# Patient Record
Sex: FEMALE | Race: BLACK OR AFRICAN AMERICAN | NOT HISPANIC OR LATINO | Employment: FULL TIME | URBAN - METROPOLITAN AREA
[De-identification: names, ages, dates, MRNs, and addresses within clinical notes are randomized per-mention and may not be internally consistent; named-entity substitution may affect disease eponyms.]

---

## 2017-10-11 ENCOUNTER — OFFICE VISIT (OUTPATIENT)
Dept: URGENT CARE | Facility: CLINIC | Age: 42
End: 2017-10-11
Payer: COMMERCIAL

## 2017-10-11 VITALS
BODY MASS INDEX: 31.55 KG/M2 | RESPIRATION RATE: 19 BRPM | HEIGHT: 67 IN | OXYGEN SATURATION: 98 % | HEART RATE: 99 BPM | WEIGHT: 201 LBS | DIASTOLIC BLOOD PRESSURE: 72 MMHG | TEMPERATURE: 99 F | SYSTOLIC BLOOD PRESSURE: 121 MMHG

## 2017-10-11 DIAGNOSIS — J06.9 UPPER RESPIRATORY TRACT INFECTION, UNSPECIFIED TYPE: Primary | ICD-10-CM

## 2017-10-11 PROCEDURE — 99203 OFFICE O/P NEW LOW 30 MIN: CPT | Mod: S$GLB,,, | Performed by: EMERGENCY MEDICINE

## 2017-10-11 RX ORDER — BENZONATATE 200 MG/1
200 CAPSULE ORAL 3 TIMES DAILY PRN
Qty: 30 CAPSULE | Refills: 0 | Status: SHIPPED | OUTPATIENT
Start: 2017-10-11 | End: 2017-10-21

## 2017-10-11 RX ORDER — TRAZODONE HYDROCHLORIDE 100 MG/1
100 TABLET ORAL NIGHTLY
COMMUNITY

## 2017-10-11 RX ORDER — SERTRALINE HYDROCHLORIDE 100 MG/1
100 TABLET, FILM COATED ORAL DAILY
COMMUNITY

## 2017-10-11 RX ORDER — ALPRAZOLAM 1 MG/1
TABLET ORAL 2 TIMES DAILY
COMMUNITY

## 2017-10-11 NOTE — PROGRESS NOTES
"Subjective:       Patient ID: Adriana Cardozo is a 42 y.o. female.    Vitals:  height is 5' 7" (1.702 m) and weight is 91.2 kg (201 lb). Her tympanic temperature is 98.7 °F (37.1 °C). Her blood pressure is 121/72 and her pulse is 99. Her respiration is 19 and oxygen saturation is 98%.     Chief Complaint: Cough    Patient with nasal congestion x 3 days. Patient also with sinus drainage and sneezing. Patient developed a cough yesterday evening. Patient stating she is a  and her work is not allowing her to come back until she has a work note. No fever. No chills. Patient without body aches.   Pt doesn't feel bad but her work requires a note      Cough   This is a new problem. The current episode started yesterday. The problem has been unchanged. The cough is non-productive. Associated symptoms include a sore throat. Pertinent negatives include no chest pain, chills, ear pain, eye redness, fever, headaches, myalgias, shortness of breath or wheezing.     Review of Systems   Constitution: Positive for malaise/fatigue. Negative for chills and fever.   HENT: Positive for congestion, hoarse voice and sore throat. Negative for ear pain.    Eyes: Negative for discharge and redness.   Cardiovascular: Negative for chest pain, dyspnea on exertion and leg swelling.   Respiratory: Positive for cough. Negative for shortness of breath, sputum production and wheezing.    Musculoskeletal: Negative for myalgias.   Gastrointestinal: Negative for abdominal pain and nausea.   Neurological: Negative for headaches.       Objective:      Physical Exam   Constitutional: She is oriented to person, place, and time. She appears well-developed and well-nourished. She is cooperative.  Non-toxic appearance. She does not appear ill. No distress.   HENT:   Head: Normocephalic and atraumatic.   Right Ear: Hearing, tympanic membrane, external ear and ear canal normal.   Left Ear: Hearing, tympanic membrane, external ear and ear canal " normal.   Nose: Mucosal edema and rhinorrhea present. No nasal deformity. No epistaxis. Right sinus exhibits no maxillary sinus tenderness and no frontal sinus tenderness. Left sinus exhibits no maxillary sinus tenderness and no frontal sinus tenderness.   Mouth/Throat: Uvula is midline and mucous membranes are normal. No trismus in the jaw. Normal dentition. No uvula swelling. Posterior oropharyngeal erythema present.   Slightly hoarse   Eyes: Conjunctivae, EOM and lids are normal. Pupils are equal, round, and reactive to light. No scleral icterus.   Sclera clear bilat   Neck: Trachea normal, normal range of motion, full passive range of motion without pain and phonation normal. Neck supple.   Cardiovascular: Normal rate, regular rhythm, normal heart sounds, intact distal pulses and normal pulses.    Pulmonary/Chest: Effort normal and breath sounds normal. No respiratory distress.   Abdominal: Soft. Normal appearance and bowel sounds are normal. She exhibits no distension. There is no tenderness.   Musculoskeletal: Normal range of motion. She exhibits no edema or deformity.   Neurological: She is alert and oriented to person, place, and time. She exhibits normal muscle tone. Coordination normal.   Skin: Skin is warm, dry and intact. She is not diaphoretic. No pallor.   Psychiatric: She has a normal mood and affect. Her speech is normal and behavior is normal. Judgment and thought content normal. Cognition and memory are normal.   Nursing note and vitals reviewed.      Assessment:       1. Upper respiratory tract infection, unspecified type        Plan:         Upper respiratory tract infection, unspecified type    Other orders  -     benzonatate (TESSALON) 200 MG capsule; Take 1 capsule (200 mg total) by mouth 3 (three) times daily as needed for Cough.  Dispense: 30 capsule; Refill: 0

## 2017-10-11 NOTE — LETTER
October 11, 2017      [facility] [place] [id number] [facility] [address]  Phone: [phone]  Fax: [phone]       Patient: Adriana Cardozo   YOB: 1975  Date of Visit: 10/11/2017    To Whom It May Concern:    Ben Cardozo  was at [facility] on 10/11/2017. She was seen here today for a URI. She should use general hygiene precautions. She has no fever and can return to work. If you have any questions or concerns, or if I can be of further assistance, please do not hesitate to contact me.    Sincerely,    Sapphire Leon MD

## 2019-02-26 ENCOUNTER — OFFICE VISIT (OUTPATIENT)
Dept: SPORTS MEDICINE | Facility: CLINIC | Age: 44
End: 2019-02-26
Payer: COMMERCIAL

## 2019-02-26 ENCOUNTER — HOSPITAL ENCOUNTER (OUTPATIENT)
Dept: RADIOLOGY | Facility: HOSPITAL | Age: 44
Discharge: HOME | End: 2019-02-26
Attending: PHYSICIAN ASSISTANT
Payer: COMMERCIAL

## 2019-02-26 VITALS
BODY MASS INDEX: 31.55 KG/M2 | SYSTOLIC BLOOD PRESSURE: 151 MMHG | HEIGHT: 67 IN | DIASTOLIC BLOOD PRESSURE: 86 MMHG | HEART RATE: 66 BPM | WEIGHT: 201 LBS

## 2019-02-26 DIAGNOSIS — G57.02 PIRIFORMIS SYNDROME OF LEFT SIDE: ICD-10-CM

## 2019-02-26 DIAGNOSIS — M25.552 LEFT HIP PAIN: Primary | ICD-10-CM

## 2019-02-26 DIAGNOSIS — E66.9 CLASS 1 OBESITY WITH BODY MASS INDEX (BMI) OF 31.0 TO 31.9 IN ADULT, UNSPECIFIED OBESITY TYPE, UNSPECIFIED WHETHER SERIOUS COMORBIDITY PRESENT: ICD-10-CM

## 2019-02-26 DIAGNOSIS — M25.552 LEFT HIP PAIN: ICD-10-CM

## 2019-02-26 PROCEDURE — 73502 X-RAY EXAM HIP UNI 2-3 VIEWS: CPT | Mod: 26,LT,, | Performed by: RADIOLOGY

## 2019-02-26 PROCEDURE — 99999 PR PBB SHADOW E&M-EST. PATIENT-LVL III: ICD-10-PCS | Mod: PBBFAC,,, | Performed by: PHYSICIAN ASSISTANT

## 2019-02-26 PROCEDURE — 99999 PR PBB SHADOW E&M-EST. PATIENT-LVL III: CPT | Mod: PBBFAC,,, | Performed by: PHYSICIAN ASSISTANT

## 2019-02-26 PROCEDURE — 73502 XR HIP 2 VIEW LEFT: ICD-10-PCS | Mod: 26,LT,, | Performed by: RADIOLOGY

## 2019-02-26 PROCEDURE — 73502 X-RAY EXAM HIP UNI 2-3 VIEWS: CPT | Mod: TC,FY,PO,LT

## 2019-02-26 PROCEDURE — 99203 PR OFFICE/OUTPT VISIT, NEW, LEVL III, 30-44 MIN: ICD-10-PCS | Mod: S$GLB,,, | Performed by: PHYSICIAN ASSISTANT

## 2019-02-26 PROCEDURE — 99203 OFFICE O/P NEW LOW 30 MIN: CPT | Mod: S$GLB,,, | Performed by: PHYSICIAN ASSISTANT

## 2019-02-26 RX ORDER — MELOXICAM 15 MG/1
TABLET ORAL
Qty: 20 TABLET | Refills: 0 | Status: SHIPPED | OUTPATIENT
Start: 2019-02-26 | End: 2019-03-13 | Stop reason: SDUPTHER

## 2019-02-27 NOTE — PROGRESS NOTES
CC: left hip pain    HPI:   Adriana Cardozo is a pleasant 43 y.o.  (occasionally lifts some patients), who reports to clinic with left hip pain. No trauma, no mech sxs/instabilty.    She reports intermittent jabbing and stabbing pain of the posterior hip and buttock area that radiates down the posterior thigh. Pain has been occurring for the last 2 weeks and has been worsening over that time. She also reports intermittent numbness and tingling of her left 1st and 2nd toes.    Patient denies back pain.     She has been taking 800mg of ibuprofen every 6 hours with little pain relief.     She walked in 2 parades with her daughter over the weekend. Pain was improved while walking but worsened after.     Today the patient rates pain at a 5/10 on visual analog scale.      Affecting ADLs and exercising    Sitting and rest between activity makes pain worse    Review of Systems   Constitution: Negative. Negative for chills, fever and night sweats.   HENT: Negative for congestion and headaches.    Eyes: Negative for blurred vision, left vision loss and right vision loss.   Cardiovascular: Negative for chest pain and syncope.   Respiratory: Negative for cough and shortness of breath.    Endocrine: Negative for polydipsia, polyphagia and polyuria.   Hematologic/Lymphatic: Negative for bleeding problem. Does not bruise/bleed easily.   Skin: Negative for dry skin, itching and rash.   Musculoskeletal: Negative for falls and muscle weakness.   Gastrointestinal: Negative for abdominal pain and bowel incontinence.   Genitourinary: Negative for bladder incontinence and nocturia.   Neurological: Negative for disturbances in coordination, loss of balance and seizures.   Psychiatric/Behavioral: Negative for depression. The patient does not have insomnia.    Allergic/Immunologic: Negative for hives and persistent infections.   All other systems negative.    PAST MEDICAL HISTORY:   Past Medical History:   Diagnosis Date     "Anxiety     PTSD (post-traumatic stress disorder)      PAST SURGICAL HISTORY: History reviewed. No pertinent surgical history.  FAMILY HISTORY:   Family History   Problem Relation Age of Onset    Cancer Mother      SOCIAL HISTORY:   Social History     Socioeconomic History    Marital status: Single     Spouse name: Not on file    Number of children: Not on file    Years of education: Not on file    Highest education level: Not on file   Social Needs    Financial resource strain: Not on file    Food insecurity - worry: Not on file    Food insecurity - inability: Not on file    Transportation needs - medical: Not on file    Transportation needs - non-medical: Not on file   Occupational History    Not on file   Tobacco Use    Smoking status: Never Smoker    Smokeless tobacco: Never Used   Substance and Sexual Activity    Alcohol use: No     Comment: quit drinking 90 days ago for [employer] purposes    Drug use: No    Sexual activity: Not on file   Other Topics Concern    Not on file   Social History Narrative    Not on file       MEDICATIONS:   Current Outpatient Medications:     alprazolam (XANAX) 1 MG tablet, Take by mouth 2 (two) times daily., Disp: , Rfl:     meloxicam (MOBIC) 15 MG tablet, Take 1 tablet by mouth once daily with food. Take 20mg prilosec once daily on days that you are taking the mobic to protect your stomach., Disp: 20 tablet, Rfl: 0    PRAZOSIN HCL (PRAZOSIN ORAL), Take by mouth., Disp: , Rfl:     sertraline (ZOLOFT) 100 MG tablet, Take 100 mg by mouth once daily., Disp: , Rfl:     trazodone (DESYREL) 100 MG tablet, Take 100 mg by mouth every evening., Disp: , Rfl:   ALLERGIES: Review of patient's allergies indicates:  No Known Allergies    VITAL SIGNS: BP (!) 151/86   Pulse 66   Ht 5' 7" (1.702 m)   Wt 91.2 kg (201 lb)   BMI 31.48 kg/m²        PHYSICAL EXAM /  HIP  PHYSICAL EXAMINATION  General:  The patient is alert and oriented x 3.  Mood is pleasant.  Observation of " ears, eyes and nose reveal no gross abnormalities.  HEENT: NCAT, sclera nonicteric  Lungs: Respirations are equal and unlabored..    left HIP EXAMINATION     OBSERVATION / INSPECTION  Gait:   Nonantalgic   Alignment:  Neutral   Scars:   None   Muscle atrophy: None   Effusion:  None   Warmth:  None   Discoloration:   None   Leg lengths:   Equal   Pelvis:   Level     TENDERNESS / CREPITUS (T/C):      T / C  Trochanteric bursa   - / -  Piriformis    + / -  SI joint    - / -  Psoas tendon   - / -  Rectus insertion  - / -  Adductor insertion  - / -  Pubic symphysis  - / -  IT band                                   - / -  Gluteus tendons                     - / -    ROM: (* = pain)    Flexion:    120 degrees*  External rotation: 50 degrees  Internal rotation with axial load: 25 degrees  Internal rotation without axial load: 35 degrees*  Abduction:  45 degrees  Adduction:   20 degrees    SPECIAL TESTS:  Pain w/ forced internal rotation (FADIR): -   Pain w/ forced external rotation (LIAT): Absent   Circumduction test:    -  Stinchfield test:    Negative   Log roll:      Negative   Snapping hip (internal):   Negative   Sit-up pain:     Negative   Resisted sit-up pain:    Negative   Resisted sit-up with adductor contraction pain:  Negative   Step-down test:    +  Trendelenburg test:    Negative  Bridge test     +     EXTREMITY NEURO-VASCULAR EXAMINATION:   Sensation:  Grossly intact to light touch all dermatomal regions.   Motor Function:  Fully intact motor function at hip, knee, foot and ankle    DTRs;  quadriceps and  achilles 2+.  No clonus and downgoing Babinski.    Vascular status:  DP and PT pulses 2+, brisk capillary refill, symmetric.    Skin:  intact, compartments soft.    OTHER FINDINGS:    + left buttock pain with straight leg raise on left at 35 degrees. No back pain though    XRAYS:  2 hip and pelvis views were independently reviewed and interpreted by myself.  No fracture, subluxation, mild DJD of left  hip.    ASSESSMENT:    1. left hip pain, acute  2. Left hip piriformis syndrome and muscle pain  hip abd/core weakness    PLAN:  1. PT for left hip abd/core, abd strengthening with HEP and piriformis work  2. mobic with prilosec. Stop other NSAIDs.  The patient was advised that NSAID-type medications have two very important potential side effects: gastrointestinal irritation including hemorrhage and renal injuries. Patient was asked to take the medication with food and to stop if they experiences any GI upset. I asked her to call for vomiting, abdominal pain or black/bloody stools. The patient expresses understanding of these issues and questions were answered. Do not take with OTC NSAIDs as discussed.       3. Ice compresses  4. Referral to Dr. Glez for left piriformis tendon/ muscle injection  5. Weight loss and the importance of this for overall joint health was discussed with the patient today.Will come up with goal next time.  6. RTC in 3-4 weeks for reheck    All questions were answered, pt will contact us for questions or concerns in the interim.

## 2019-03-04 ENCOUNTER — OFFICE VISIT (OUTPATIENT)
Dept: SPORTS MEDICINE | Facility: CLINIC | Age: 44
End: 2019-03-04
Payer: COMMERCIAL

## 2019-03-04 VITALS — BODY MASS INDEX: 31.55 KG/M2 | WEIGHT: 201 LBS | HEIGHT: 67 IN

## 2019-03-04 DIAGNOSIS — M76.892 HAMSTRING TENDINITIS OF LEFT THIGH: ICD-10-CM

## 2019-03-04 DIAGNOSIS — M79.18 PIRIFORMIS MUSCLE PAIN: ICD-10-CM

## 2019-03-04 DIAGNOSIS — M25.552 LEFT HIP PAIN: Primary | ICD-10-CM

## 2019-03-04 DIAGNOSIS — M70.72 BURSITIS OF OTHER BURSA OF LEFT HIP: ICD-10-CM

## 2019-03-04 PROCEDURE — 99214 PR OFFICE/OUTPT VISIT, EST, LEVL IV, 30-39 MIN: ICD-10-PCS | Mod: 25,S$GLB,, | Performed by: FAMILY MEDICINE

## 2019-03-04 PROCEDURE — 99999 PR PBB SHADOW E&M-EST. PATIENT-LVL II: CPT | Mod: PBBFAC,,, | Performed by: FAMILY MEDICINE

## 2019-03-04 PROCEDURE — 20551 NJX 1 TENDON ORIGIN/INSJ: CPT | Mod: LT,S$GLB,, | Performed by: FAMILY MEDICINE

## 2019-03-04 PROCEDURE — 76942 ECHO GUIDE FOR BIOPSY: CPT | Mod: 26,S$GLB,, | Performed by: FAMILY MEDICINE

## 2019-03-04 PROCEDURE — 99999 PR PBB SHADOW E&M-EST. PATIENT-LVL II: ICD-10-PCS | Mod: PBBFAC,,, | Performed by: FAMILY MEDICINE

## 2019-03-04 PROCEDURE — 20551 TENDON ORIGIN: L HIP JOINT: ICD-10-PCS | Mod: 51,LT,S$GLB, | Performed by: FAMILY MEDICINE

## 2019-03-04 PROCEDURE — 99214 OFFICE O/P EST MOD 30 MIN: CPT | Mod: 25,S$GLB,, | Performed by: FAMILY MEDICINE

## 2019-03-04 PROCEDURE — 76942 TENDON ORIGIN: L HIP JOINT: ICD-10-PCS | Mod: 26,S$GLB,, | Performed by: FAMILY MEDICINE

## 2019-03-04 RX ORDER — TRIAMCINOLONE ACETONIDE 40 MG/ML
40 INJECTION, SUSPENSION INTRA-ARTICULAR; INTRAMUSCULAR
Status: DISCONTINUED | OUTPATIENT
Start: 2019-03-04 | End: 2019-03-04 | Stop reason: HOSPADM

## 2019-03-04 RX ADMIN — TRIAMCINOLONE ACETONIDE 40 MG: 40 INJECTION, SUSPENSION INTRA-ARTICULAR; INTRAMUSCULAR at 11:03

## 2019-03-04 NOTE — PROCEDURES
"Tendon Origin: L hip joint  Date/Time: 3/4/2019 11:28 AM  Performed by: Gopi Glez MD  Authorized by: Gopi Glez MD     Consent Done?:  Yes (Verbal)  Timeout: prior to procedure the correct patient, procedure, and site was verified    Indications:  Pain  Site marked: the procedure site was marked    Timeout: prior to procedure the correct patient, procedure, and site was verified    Location:  Hip  Site:  L hip joint  Prep: patient was prepped and draped in usual sterile fashion    Ultrasonic Guidance for Needle Placement?: Yes    Needle size:  20 G  Approach:  Posterior  Medications:  40 mg triamcinolone acetonide 40 mg/mL  Patient tolerance:  Patient tolerated the procedure well with no immediate complications   Hamstring conjoined tendon origin and overlying ischial bursa  Description of ultrasound utilization for needle guidance:   Ultrasound guidance used for needle localization. Images saved and stored for documentation. The hamstring conjoined tendon was visualized at its attachment on the ischial tuberosity, as was the overlying ischial bursa. Dynamic visualization of the 20g x 3.5" needle was continuous throughout the procedure.      "

## 2019-03-04 NOTE — PROCEDURES
"Tendon Origin: L hip joint  Date/Time: 3/4/2019 11:27 AM  Performed by: Gopi Glez MD  Authorized by: Gopi Glez MD     Consent Done?:  Yes (Verbal)  Timeout: prior to procedure the correct patient, procedure, and site was verified    Indications:  Pain  Site marked: the procedure site was marked    Timeout: prior to procedure the correct patient, procedure, and site was verified    Location:  Hip  Site:  L hip joint  Prep: patient was prepped and draped in usual sterile fashion    Ultrasonic Guidance for Needle Placement?: Yes    Needle size:  20 G  Approach:  Posterolateral  Medications:  40 mg triamcinolone acetonide 40 mg/mL  Patient tolerance:  Patient tolerated the procedure well with no immediate complications   Piriformis muscle, tendon sheath, and tendon insertion injection  Description of ultrasound utilization for needle guidance:   Ultrasound guidance used for needle localization. Images saved and stored for documentation. The piriformis muscle and tendon were visualized.  Dynamic visualization of the 20g x 3.5" needle was continuous throughout the procedure.      "

## 2019-03-04 NOTE — PROGRESS NOTES
Adriana Cardozo, a 43 y.o. female, is here for evaluation of Left hip.     HISTORY OF PRESENT ILLNESS     19.03.02: Patient has had intermittent back pain for the last 3 weeks. 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      Location: posterior hip/thigh, left  Onset: acute, > 2 weeks   Palliative:    Relative rest   Oral analgesics - Mobic  Provocative:   ADLs  lifting patients    Prior: Seesamia Chaidez PA-C left hip   Progression: worsening discomfort   Quality:    Jabbing   stabbing  Radiation: numbness/tingling S1/S2  Severity: per nursing documentation  Timing: intermittent with use  Trauma: none      Review of systems (ROS):  A 10+ review of systems was performed with pertinent positives and negatives noted above in the history of present illness. Other systems were negative unless otherwise specified.      PHYSICAL EXAMINATION  General:  The patient is alert and oriented x 3.  Mood is pleasant.  Observation of ears, eyes and nose reveal no gross abnormalities.  HEENT: NCAT, sclera nonicteric  Lungs: Respirations are equal and unlabored.   Gait is coordinated. Patient can toe walk and heel walk without difficulty.    HIP/PELVIS EXAMINATION    Observation/Inspection  Gait:   Nonantalgic   Alignment:  Neutral   Scars:   None   Muscle atrophy: None   Effusion:  None   Warmth:  None   Discoloration:   None   Leg lengths:   Equal   Pelvis:    Level     Tenderness/Crepitus (T/C):      T / C  Trochanteric bursa   - / -  Piriformis    - / -  SI joint    - / -  Psoas tendon   - / -  Rectus insertion  - / -  Adductor insertion  - / -  Pubic symphysis  - / -  Ischial tuberosity  +/-    ROM: (* = pain)    Flexion:      120 degrees*  External rotation:   40 degrees  Internal rotation with axial load:  30 degrees*  Internal rotation without axial load:  40 degrees*  Abduction:    45 degrees  Adduction:     20 degrees    Special Tests:  Pain w/ forced internal rotation (FADIR):  -   Pain w/ forced  external rotation (LIAT):  -   Circumduction test:     -  Stinchfield test:     -   Log roll:       -   Snapping hip (internal):    -   Sit-up pain:      -   Resisted sit-up pain:     -   Resisted sit-up with adductor contraction pain:  -   Step-down test:     +  Trendelenburg test:     -  Bridge test      +     Extremity Neuro-vascular Examination:   Sensation:  Grossly intact to light touch all dermatomal regions.   Motor Function:  Fully intact motor function at hip, knee, foot and ankle    DTRs;  quadriceps and  achilles 2+.  No clonus and downgoing Babinski.    Vascular status:  DP and PT pulses 2+, brisk capillary refill, symmetric.    Skin:  intact, compartments soft.    Other Findings:    ASSESSMENT & PLAN  Assessment:   #1 hip / pelvis pain, left     No evidence of neurologic pathology  No evidence of vascular pathology    Imaging studies reviewed:   X-ray pelvis and hip, left 19.02    Plan:    We discussed the importance of appropriate diet, weight, and regular exercise including quadriceps strengthening     We discussed options including:  #1 watchful waiting  #2 physical therapy aimed at:   Core stability   RoM hip   Strengthening quadriceps   Gait training   #3 injection therapy:   CSI GTB    Right,     Left,    CSI iaHip    Right,     Left,    Orthobiologics   #4 MRI for further evaluation      The patient chooses #2 and #3 csi piriformis and csi conjoined hamstring tendon    Pain management: handout given  Bracing:   Physical therapy: fPT, @ [facility], begin as above   Activity (e.g. 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

## 2019-03-04 NOTE — PROCEDURES
"Large Joint Aspiration/Injection: L hip joint  Date/Time: 3/4/2019 11:27 AM  Performed by: Gopi Glez MD  Authorized by: Gopi Glez MD     Consent Done?:  Yes (Verbal)  Indications:  Pain  Procedure site marked: Yes    Timeout: Prior to procedure the correct patient, procedure, and site was verified      Location:  Hip  Site:  L hip joint  Prep: Patient was prepped and draped in usual sterile fashion    Ultrasonic Guidance for needle placement: Yes  Images are saved and documented.  Needle size:  20 G  Approach:  Posterior  Medications:  40 mg triamcinolone acetonide 40 mg/mL  Patient tolerance:  Patient tolerated the procedure well with no immediate complications    Additional Comments: Ischial bursa injection  Description of ultrasound utilization for needle guidance:   Ultrasound guidance used for needle localization.  Images saved and stored for documentation.  The ischial tuberosity and ischial bura were visualized.  Dynamic visualization of the 20g x 3.5"  needle was continuous throughout the procedure.      "

## 2019-03-13 ENCOUNTER — CLINICAL SUPPORT (OUTPATIENT)
Dept: REHABILITATION | Facility: OTHER | Age: 44
End: 2019-03-13
Payer: COMMERCIAL

## 2019-03-13 ENCOUNTER — TELEPHONE (OUTPATIENT)
Dept: SPORTS MEDICINE | Facility: CLINIC | Age: 44
End: 2019-03-13

## 2019-03-13 DIAGNOSIS — R53.1 DECREASED STRENGTH, ENDURANCE, AND MOBILITY: ICD-10-CM

## 2019-03-13 DIAGNOSIS — R68.89 DECREASED STRENGTH, ENDURANCE, AND MOBILITY: ICD-10-CM

## 2019-03-13 DIAGNOSIS — M76.892 HAMSTRING TENDINITIS OF LEFT THIGH: ICD-10-CM

## 2019-03-13 DIAGNOSIS — M25.552 LEFT HIP PAIN: ICD-10-CM

## 2019-03-13 DIAGNOSIS — G57.02 PIRIFORMIS SYNDROME OF LEFT SIDE: ICD-10-CM

## 2019-03-13 DIAGNOSIS — M70.72 BURSITIS OF OTHER BURSA OF LEFT HIP: ICD-10-CM

## 2019-03-13 DIAGNOSIS — Z74.09 DECREASED STRENGTH, ENDURANCE, AND MOBILITY: ICD-10-CM

## 2019-03-13 DIAGNOSIS — M79.18 PIRIFORMIS MUSCLE PAIN: Primary | ICD-10-CM

## 2019-03-13 PROCEDURE — 97140 MANUAL THERAPY 1/> REGIONS: CPT | Mod: PN

## 2019-03-13 PROCEDURE — 97161 PT EVAL LOW COMPLEX 20 MIN: CPT | Mod: PN

## 2019-03-13 RX ORDER — MELOXICAM 15 MG/1
TABLET ORAL
Qty: 20 TABLET | Refills: 0 | Status: SHIPPED | OUTPATIENT
Start: 2019-03-13 | End: 2019-11-18

## 2019-03-13 RX ORDER — TRAMADOL HYDROCHLORIDE 50 MG/1
50-100 TABLET ORAL EVERY 12 HOURS PRN
Qty: 16 TABLET | Refills: 0 | Status: SHIPPED | OUTPATIENT
Start: 2019-03-13 | End: 2019-11-18

## 2019-03-13 NOTE — TELEPHONE ENCOUNTER
Patient is having severe hip pain and not getting relief from injection. Will give her a refill of mobic and a few tramadol to help the pain acutely.

## 2019-03-14 NOTE — PROGRESS NOTES
Dry Needling Daily Note     Patient ID: Adriana Cardozo is a 43 y.o. female.  Diagnosis:   1. Left hip pain     2. Decreased strength, endurance, and mobility       Date:  03/13/2019    Start Time:  11:30  Stop Time:  11:55    Subjective:     Pt reports: increased L hip pain x 1 month.  Pain Scale: Adriana rates pain on a scale of 0-10 to be 7 currently.    Objective:     Pt signed written consent to dry needling Rx.  Pt gave verbal consent for DN.   Pt rec'd dry needling to L hip with 3 in needles with no adverse effects.    Homeostatic points:  1. Deep Radial  2. Greater Auricular  3. Spinal Accessory  4. Saphenous  5. Deep Fibular  6. Tibial  7. Greater Occipital  8. Suprascapular ( infraspinatus)  9. Lateral Antebrachial Cutaneous  10. Sural  11. Lateral Popliteal  12. Superficial Radial  13. Dorsal Scapular  14. Superior Cluneal  15. Posterior Cutaneous L 2  16. Inferior Gluteal  17. Lateral Pectoral  18. Ilitotibal  19. Infraorbital  20. Spinous process T7  21. Posterior cutaneous  T6  22. Posterior cutaneous L 5  23. Supraorbital  24. Common fibular    Paravertebral Points:  None today    Symptomatic Points:   TFL, glutes med, min, piriformis    Assessment:     Patient demonstrated appropriate response to FDN. Increased ST dysfunction noted to ST surrounding GT bursa. Winding technique used in 5 min increments to reduce muscle tension and relieve pain. Pt noted significant improvement in posture, walking tolerance, and pain intensity at end of session.    Patient Education/Response:     Education provided re:   Purpose, benefits, and potential side effects of dry needling.   Educated pt to use heat following treatment sessions to reduce c/o pain or soreness and to improve circulation to needled sites.   Encouraged pt to continue with HEP to maintain flexibility, ROM, and functional mobility.  Adriana verbalized good understanding of education     Plans and Goals:     Monitor response to FDN. Continue with  FDN in POC as tolerated.     Kathi John, PT  3/13/2019

## 2019-03-14 NOTE — PLAN OF CARE
"[facility]  Physical Therapy Initial Evaluation    Name: Adriana Cardozo  Clinic Number: [id number]    Therapy Diagnosis:   Encounter Diagnoses   Name Primary?    Left hip pain     Decreased strength, endurance, and mobility      Physician: Bar Chaidez III, *    Physician Orders: PT Eval and Treat - treat bilaterally with focus on left  Medical Diagnosis from Referral:   M25.552 (ICD-10-CM) - Left hip pain   G57.02 (ICD-10-CM) - Piriformis syndrome of left side       Evaluation Date: 3/13/2019  Authorization Period Expiration: 12/31/2019  Plan of Care Expiration: 6/13/2019  Visit # / Visits authorized: 1/ 20    Time In: 11:00  Time Out: 12:00  Total Billable Time: 60 minutes    Precautions: Standard    Subjective   Date of onset: 1 month  History of current condition - Adriana reports: insidious onset of L hip pain that gradually worsened over the past 4 weeks. She notes attending a bowling tournament without any prior experience with the sport. "I was trying to be like a pro, with my leg sliding behind me." She denies immediately pain after tournament, but notes waking up with worsening symptoms.  Pt reports pain with pressure on L hip, including sitting and lying supine.       Medical History:   Past Medical History:   Diagnosis Date    Anxiety     PTSD (post-traumatic stress disorder)        Surgical History:   Adriana Cardozo  has no past surgical history on file.    Medications:   Adriana has a current medication list which includes the following prescription(s): alprazolam, meloxicam, prazosin hcl, sertraline, tramadol, and trazodone.    Allergies:   Review of patient's allergies indicates:  No Known Allergies     Imaging, Xray on 02/26/2019: Findings - Mild DJD.  Faintly visualized sclerotic changes identified at the superior aspect of the left femoral head and AVN cannot be ruled out.  Further evaluation by MRI would be helpful.  No fracture or dislocation.  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)  Prior Level of Function: Independent with ADLs, HHCs, work and school activities  Current Level of Function: pain and difficulty with all functional activities    Pain:  Current 7/10, worst 10/10, best 6/10   Location: left hip and glute   Description: Burning and Deep  Aggravating Factors: direct perssure with sitting, sleeping (disrupted several times per night), getting in/out of chair, bed, stair climbing, prolonged standing and standing increases sharp pain in the calf, sitting with CPU work and driving  Easing Factors: pain medication    Pts goals: reduce pain to be able to sit through work and school    Objective     Palpation: TTP L TFL, glutes med/min, piriformis    Hip Right  Left  Pain/Dysfunction with Movement    PROM MMT PROM MMT    Flexion WFL 5 Min murray* 3+ *indicates pain  MMT limited by pain   Extension Mod murray 4 Mod murray 3    Abduction WFL 4 WFL 3    Adduction Min murray 5 Min-  Mod murray* 4-    Internal rotation Min murray 4 Min murray* 3    External rotation WFL 4 WFL 3      Knee ROM: grossly WFL  Knee strength: 5/5  Ankle ROM: grossly WFL  Ankle strength: 5/5    Flexibility:  Tony test   Hip flexors: fair-   Quads: fair-  Ronald test   ITB: fair-  Hamstring (SLR): fair-    Special tests:  Quadrant test: -  Scour test: NT  ASIS compression: -  ASIS distraction: -  SLR: -  Contralateral SLR: -  NTT: -  LIAT: +  FADIR: +    Joint Mobility: NT 2* pain  Balance: NT 2* to pain with standing       CMS Impairment/Limitation/Restriction for FOTO Hip Survey    Therapist reviewed FOTO scores for Adriana Cardozo on 3/13/2019.   FOTO documents entered into [employer] - see Media section.    Limitation Score: 45%  Category: Mobility    Current : CK = at least 40% but < 60% impaired, limited or  "restricted  Goal: CI = at least 1% but < 20% impaired, limited or restricted  Discharge: N/A         TREATMENT   Treatment Time In: 11:30  Treatment Time Out: 12:00  Total Treatment time separate from Evaluation: 30 minutes    Adriana received therapeutic exercises to develop strength, endurance, ROM, flexibility, posture and core stabilization for 5 minutes including:  Figure 4 IR stretch: 3 x 30"    Adriana received the following manual therapy techniques: 25 min x dry needling - see note by Kathi Lema, PT    Home Exercises and Patient Education Provided    Education provided:   - Patient educated regarding pathogenesis, diagnosis, protocol, prognosis, POC, and HEP. Written Home Exercises Provided with written and verbal instructions for frequency and duration of the following exercises: figure 4 piriformis stretch. Pt educated on HEP and activity modifications to reduce c/o pain and improve overall function.   - Pt was educated in posture and body mechanics.  Use of a lumbar roll was recommended and demonstrated here today.  Purchase information provided.   - Pt also educated on use of modalities prn to reduce c/o pain and dysfunction. Advised MHP following DN to improve circulation and reduce pain.  - Pt educated on clinic's cancellation/no-show policy of missing 3 consecutive PT appointments, which will result in an automatic discharge from therapy services 2* to non-compliance, unless otherwise stated.   - Patient demo good understanding of the education provided. Patient demo good return demo of skill of exercises.    Written Home Exercises Provided: yes.  Exercises were reviewed and Adriana was able to demonstrate them prior to the end of the session.  Adriana demonstrated good  understanding of the education provided.     See EMR under Patient Instructions for exercises provided 3/13/2019.    Assessment   Adriana is a 43 y.o. female referred to outpatient Physical Therapy with a medical diagnosis of " left hip pain and piriformis syndrome. Pt presents with marked limitations in muscle and soft tissue mobility as well as ROM, flexibility, and strength. Impairments limit pt with all functional activities, including work and school.    Pt prognosis is Good.   Pt will benefit from skilled outpatient Physical Therapy to address the deficits stated above and in the chart below, provide pt/family education, and to maximize pt's level of independence.     Plan of care discussed with patient: Yes  Pt's spiritual, cultural and educational needs considered and patient is agreeable to the plan of care and goals as stated below:     Anticipated Barriers for therapy: financial consideration    Medical Necessity is demonstrated by the following  History  Co-morbidities and personal factors that may impact the plan of care Co-morbidities:   coping style/mechanism, depression, difficulty sleeping, education level, excessive commute time/distance, financial considerations and level of undertstanding of current condition    Personal Factors:   age  coping style  social background  lifestyle     moderate   Examination  Body Structures and Functions, activity limitations and participation restrictions that may impact the plan of care Body Regions:   back  lower extremities  trunk    Body Systems:    gross symmetry  ROM  strength  gross coordinated movement  balance  gait  transfers  transitions  motor control  motor learning  posture, MF mobility, flexibility    Participation Restrictions:   ADLs, HHCs, driving, sleeping, work, school    Activity limitations:   Learning and applying knowledge  no deficits    General Tasks and Commands  no deficits    Communication  no deficits    Mobility  walking  driving (bike, car, motorcycle)    Self care  looking after one's health    Domestic Life  shopping  cooking  doing house work (cleaning house, washing dishes, laundry)  assisting others    Interactions/Relationships  no deficits    Life  Areas  school education  employment    Community and Social Life  community life  recreation and leisure  Christianity and spirituality         moderate   Clinical Presentation stable and uncomplicated low   Decision Making/ Complexity Score: low     Goals:  Short Term Goals (4 Weeks):  1. 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  2.  Pt able to transfer in/out of chairs of various heights with <5/10 pain.  3. Pt able to sleep >4 hours without pain disturbance.  4. Pt to demonstrate improved functional ability with FOTO limitation <=35% disability.    Long Term Goals (8 Weeks):  1. Pt able to sit >= 1 hour with CPU work with <3/10 pain.  2. Pt is independent with all bed mobility.  3. Pt able to sleep a full night without pain disturbance.  4. Pt able to return to full work / recreational activities with min difficulty and pain <3/10.  5. Pt will be independent with HEP and self management of symptoms.   6. Pt to demonstrate improved functional ability with FOTO limitation <=25% disability.      Plan     Plan of care Certification: 3/13/2019 to 06/13/2019.     Outpatient Physical Therapy 2 times weekly for 8 weeks to include the following interventions: Aquatic Therapy, Cervical/Lumbar Traction, Electrical Stimulation prn, Iontophoresis (with dexamethasone prn), Manual Therapy, Moist Heat/ Ice, Neuromuscular Re-ed, Patient Education, Self Care, Therapeutic Activites, Therapeutic Exercise and IASTYM, therapeutic taping, dry needling. Progress HEP towards D/C. Recommend F/U with MD if symptoms worsen or do not resolve. Patient may be seen by a PTA for treatment to carry out their plan of care.  Face-to-face conferences will be held.           Kathi Jonh, PT

## 2019-03-15 ENCOUNTER — CLINICAL SUPPORT (OUTPATIENT)
Dept: REHABILITATION | Facility: OTHER | Age: 44
End: 2019-03-15
Payer: COMMERCIAL

## 2019-03-15 DIAGNOSIS — Z74.09 DECREASED STRENGTH, ENDURANCE, AND MOBILITY: ICD-10-CM

## 2019-03-15 DIAGNOSIS — R53.1 DECREASED STRENGTH, ENDURANCE, AND MOBILITY: ICD-10-CM

## 2019-03-15 DIAGNOSIS — R68.89 DECREASED STRENGTH, ENDURANCE, AND MOBILITY: ICD-10-CM

## 2019-03-15 DIAGNOSIS — M25.552 LEFT HIP PAIN: ICD-10-CM

## 2019-03-15 PROCEDURE — 97110 THERAPEUTIC EXERCISES: CPT | Mod: PN

## 2019-03-15 NOTE — PROGRESS NOTES
"  Physical Therapy Daily Treatment Note     Name: Adriana HERNANDEZ [facility]  Clinic Number: [id number]    Therapy Diagnosis:   Encounter Diagnoses   Name Primary?    Left hip pain     Decreased strength, endurance, and mobility      Physician: Bar Chaidez III, *    Visit Date: 3/15/2019    Physician Orders: PT Eval and Treat - treat bilaterally with focus on left   Medical Diagnosis:   M25.552 (ICD-10-CM) - Left hip pain   G57.02 (ICD-10-CM) - Piriformis syndrome of left side     Evaluation Date: 3/13/2019  Authorization Period Expiration: 12/31/2019  Plan of Care Certification Period: 6/13/2019  Visit #/Visits authorized: 2/20     Time In: 11:00 AM  Time Out: 12:00 PM  Total Billable Time: 45 minutes    Precautions: Standard    Subjective     Pt reports: "My calf feels so tight that I want to hit it with a hammer." Pt states that the pain is random and can originate anywhere from her buttock, lateral knee region, and gastroc. Pt also stated that she is in most pain when she is lying flat or standing in one spot for long periods of time.  She was compliant with home exercise program.  Response to previous treatment: fine, relief from dry needling  Functional change: no new changes    Pain: 8/10  Location: left buttocks, L lateral knee, gastroc    Objective     Adriana received therapeutic exercises to develop strength, endurance, ROM, flexibility, posture and core stabilization for 45 minutes including:  Glute sets 20 x 5"  Bridges 2 x 10  Hip adduction ball squeeze 20 x 5"  SLR 2 x 10 (initially required assistance, but able to perform after few reps)  SL clamshells 20 x 5"  SL hip abd 2 x 10  Figure 4 IR/ER stretch 3 x 30"  Piriformis stretch 3 x 30"  Hamstring stretch 3 x 30 "(manually by PTA)  Gastroc SB stretch 3 x 30"    Adriana received the following manual therapy techniques: Myofacial release, Soft tissue Mobilization and Friction Massage were applied to the: L hip for 5 minutes, including:  Rolling stick to " ITB, gluteals, gastroc region    Adriana received hot pack for 10 minutes to glutes, gastroc      Home Exercises Provided and Patient Education Provided     Education provided:   - rationale behind each ex, proper form and technique    Written Home Exercises Provided: Patient instructed to cont prior HEP.  Exercises were reviewed and Adriana was able to demonstrate them prior to the end of the session.  Adriana demonstrated good  understanding of the education provided.     See EMR under Media for exercises provided prior visit.    Assessment     Fair tolerance with initial reports of increased pain during therex and pain subsided as patient continued reps. Pt required VC / TC to keep pelvis from rolling during SL clam / abd. Pt self-reports of decreased pain following intervention today.  Adriana is progressing well towards her goals.   Pt prognosis is Guarded.     Pt will continue to benefit from skilled outpatient physical therapy to address the deficits listed in the problem list box on initial evaluation, provide pt/family education and to maximize pt's level of independence in the home and community environment.     Pt's spiritual, cultural and educational needs considered and pt agreeable to plan of care and goals.     Anticipated barriers to physical therapy: financial consideration     Goals:     Short Term Goals (4 Weeks):  1. 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  2.  Pt able to transfer in/out of chairs of various heights with <5/10 pain.  3. Pt able to sleep >4 hours without pain disturbance.  4. Pt to demonstrate improved functional ability with FOTO limitation <=35% disability.     Long Term Goals (8 Weeks):  1. Pt able to sit >= 1 hour with CPU work with <3/10 pain.  2. Pt is independent with all bed mobility.  3. Pt able to sleep a full night without pain disturbance.  4. Pt able to return to full work / recreational activities with min difficulty and pain <3/10.  5. Pt will be  independent with HEP and self management of symptoms.   6. Pt to demonstrate improved functional ability with FOTO limitation <=25% disability.     Plan     Continue with established PT Plan of Care and focus on hip ROM, strengthening, and manual therapy.    Salinas Gonsales, PTA

## 2019-03-20 ENCOUNTER — CLINICAL SUPPORT (OUTPATIENT)
Dept: REHABILITATION | Facility: OTHER | Age: 44
End: 2019-03-20
Payer: COMMERCIAL

## 2019-03-20 DIAGNOSIS — M25.552 LEFT HIP PAIN: ICD-10-CM

## 2019-03-20 DIAGNOSIS — Z74.09 DECREASED STRENGTH, ENDURANCE, AND MOBILITY: ICD-10-CM

## 2019-03-20 DIAGNOSIS — R68.89 DECREASED STRENGTH, ENDURANCE, AND MOBILITY: ICD-10-CM

## 2019-03-20 DIAGNOSIS — R53.1 DECREASED STRENGTH, ENDURANCE, AND MOBILITY: ICD-10-CM

## 2019-03-20 PROCEDURE — 97110 THERAPEUTIC EXERCISES: CPT | Mod: PN

## 2019-03-20 NOTE — PROGRESS NOTES
"  Physical Therapy Daily Treatment Note     Name: Adriana HERNANDEZ [facility] Number: [id number]    Therapy Diagnosis:   Encounter Diagnoses   Name Primary?    Left hip pain     Decreased strength, endurance, and mobility      Physician: Bar Chaidez III, *    Visit Date: 3/20/2019    Physician Orders: PT Eval and Treat - treat bilaterally with focus on left   Medical Diagnosis:   M25.552 (ICD-10-CM) - Left hip pain   G57.02 (ICD-10-CM) - Piriformis syndrome of left side     Evaluation Date: 3/13/2019  Authorization Period Expiration: 12/31/2019  Plan of Care Certification Period: 6/13/2019  Visit #/Visits authorized: 3/20    Time In: 4:00 PM  Time Out: 5:00 PM  Total Billable Time: 30 minutes    Precautions: Standard    Subjective     Pt reports: "I have been trying to walk without a limp because I have been experiencing low back pain."   She was compliant with home exercise program.  Response to previous treatment: fine, relief from dry needling  Functional change: no new changes    Pain: 5/10  Location: left buttocks, L lateral knee, gastroc    Objective     Adriana received therapeutic exercises to develop strength, endurance, ROM, flexibility, posture and core stabilization for 45 minutes including:  Glute sets 20 x 5"  Bridges 2 x 10  Hip adduction ball squeeze 20 x 5"  SLR 2 x 10 (initially required assistance, but able to perform after few reps)  SL clamshells 20 x 5"  SL hip abd 2 x 10  Figure 4 IR/ER stretch 3 x 30"  Piriformis stretch 3 x 30"  Hamstring stretch 3 x 30 "(manually by PTA)  Gastroc SB stretch 3 x 30"    Adriana received the following manual therapy techniques: Myofacial release, Soft tissue Mobilization and Friction Massage were applied to the: L hip for 5 minutes, including:  Rolling stick to ITB, gluteals, gastroc region    Adriana received hot pack for 10 minutes to glutes, gastroc      Home Exercises Provided and Patient Education Provided     Education provided:   - rationale behind " each ex, proper form and technique    Written Home Exercises Provided: Patient instructed to cont prior HEP.  Exercises were reviewed and Adriana was able to demonstrate them prior to the end of the session.  Adriana demonstrated good  understanding of the education provided.     See EMR under Media for exercises provided prior visit.    Assessment     Pt tolerated treatment session today with frequent reports of increased pain throughout ex. Responded well to rolling pin and reports of relief.   Adriana is progressing well towards her goals.   Pt prognosis is Guarded.     Pt will continue to benefit from skilled outpatient physical therapy to address the deficits listed in the problem list box on initial evaluation, provide pt/family education and to maximize pt's level of independence in the home and community environment.     Pt's spiritual, cultural and educational needs considered and pt agreeable to plan of care and goals.     Anticipated barriers to physical therapy: financial consideration     Goals:     Short Term Goals (4 Weeks):  1. 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 (progressing, not met)  2.  Pt able to transfer in/out of chairs of various heights with <5/10 pain . (progressing, not met)   3. Pt able to sleep >4 hours without pain disturbance. (progressing, not met)   4. Pt to demonstrate improved functional ability with FOTO limitation <=35% disability. (progressing, not met)     Long Term Goals (8 Weeks):  1. Pt able to sit >= 1 hour with CPU work with <3/10 pain. (progressing, not met)   2. Pt is independent with all bed mobility. (progressing, not met)   3. Pt able to sleep a full night without pain disturbance. (progressing, not met)   4. Pt able to return to full work / recreational activities with min difficulty and pain <3/10. (progressing, not met)   5. Pt will be independent with HEP and self management of symptoms.  (progressing, not met)   6. Pt to demonstrate  improved functional ability with FOTO limitation <=25% disability.  (progressing, not met)     Plan     Continue with established PT Plan of Care and focus on hip ROM, strengthening, and manual therapy.    Salinas Gonsales, PTA

## 2019-03-22 ENCOUNTER — CLINICAL SUPPORT (OUTPATIENT)
Dept: REHABILITATION | Facility: OTHER | Age: 44
End: 2019-03-22
Payer: COMMERCIAL

## 2019-03-22 DIAGNOSIS — M25.552 LEFT HIP PAIN: ICD-10-CM

## 2019-03-22 DIAGNOSIS — Z74.09 DECREASED STRENGTH, ENDURANCE, AND MOBILITY: ICD-10-CM

## 2019-03-22 DIAGNOSIS — R68.89 DECREASED STRENGTH, ENDURANCE, AND MOBILITY: ICD-10-CM

## 2019-03-22 DIAGNOSIS — R53.1 DECREASED STRENGTH, ENDURANCE, AND MOBILITY: ICD-10-CM

## 2019-03-22 PROCEDURE — 97140 MANUAL THERAPY 1/> REGIONS: CPT | Mod: PN

## 2019-03-22 PROCEDURE — 97110 THERAPEUTIC EXERCISES: CPT | Mod: PN

## 2019-03-22 NOTE — PROGRESS NOTES
"  Physical Therapy Daily Treatment Note     Name: Adriana HERNANDEZ [facility] Number: [id number]    Therapy Diagnosis:   Encounter Diagnoses   Name Primary?    Left hip pain     Decreased strength, endurance, and mobility      Physician: Bar Chaidez III, *    Visit Date: 3/22/2019    Physician Orders: PT Eval and Treat - treat bilaterally with focus on left   Medical Diagnosis:   M25.552 (ICD-10-CM) - Left hip pain   G57.02 (ICD-10-CM) - Piriformis syndrome of left side     Evaluation Date: 3/13/2019  Authorization Period Expiration: 12/31/2019  Plan of Care Certification Period: 6/13/2019  Visit #/Visits authorized: 3/20    Time In: 10:00 AM  Time Out: 11:00 AM  Total Billable Time: 60 minutes    Precautions: Standard    Subjective     Pt reports: improved glute pain but continues to have pain in the upper hamstring and calf.   She was compliant with home exercise program.  Response to previous treatment: fine, relief from dry needling  Functional change: improved tolerance with walking    Pain: 3/10  Location: left buttocks, L lateral knee, gastroc    Objective     Adriana received therapeutic exercises to develop strength, endurance, ROM, flexibility, posture and core stabilization for 45 minutes including:    Glute sets 20 x 5"  Bridges 2 x 10  Hip adduction ball squeeze 20 x 5"  SLR 2 x 10 (initially required assistance, but able to perform after few reps)  SL clamshells 20 x 5"  SL hip abd 2 x 10  Figure 4 IR/ER stretch 3 x 30"  Piriformis stretch 3 x 30"  Hamstring stretch 3 x 30 "(manually by PTA) - changed to using strap today  Gastroc SB stretch 3 x 30" -- change to incline board today    Adriana received the following manual therapy techniques:   15 min x dry needling - see note by Kathi Lema, PT   00 min x Myofacial release, Soft tissue Mobilization and Friction Massage were applied to the: L hip, including: Rolling stick to ITB, gluteals, gastroc region    Adriana received hot pack for 10 " minutes to glutes, gastroc --- performed simultaneous with therex today      Home Exercises Provided and Patient Education Provided     Education provided:   - rationale behind each ex, proper form and technique    Written Home Exercises Provided: Patient instructed to cont prior HEP.  Exercises were reviewed and Adriana was able to demonstrate them prior to the end of the session.  Adriana demonstrated good  understanding of the education provided.     See EMR under Media for exercises provided prior visit.    Assessment     Pt tolerated overall session well today, with self reported reduction of glute pain at end of session. Progress glute strength next visit, with addition of sciatic nerve glides.  Adriana is progressing well towards her goals.   Pt prognosis is Guarded.     Pt will continue to benefit from skilled outpatient physical therapy to address the deficits listed in the problem list box on initial evaluation, provide pt/family education and to maximize pt's level of independence in the home and community environment.     Pt's spiritual, cultural and educational needs considered and pt agreeable to plan of care and goals.     Anticipated barriers to physical therapy: financial consideration     Goals:     Short Term Goals (4 Weeks):  1. 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 (progressing, not met)  2.  Pt able to transfer in/out of chairs of various heights with <5/10 pain . (progressing, not met)   3. Pt able to sleep >4 hours without pain disturbance. (progressing, not met)   4. Pt to demonstrate improved functional ability with FOTO limitation <=35% disability. (progressing, not met)     Long Term Goals (8 Weeks):  1. Pt able to sit >= 1 hour with CPU work with <3/10 pain. (progressing, not met)   2. Pt is independent with all bed mobility. (progressing, not met)   3. Pt able to sleep a full night without pain disturbance. (progressing, not met)   4. Pt able to return to full work  / recreational activities with min difficulty and pain <3/10. (progressing, not met)   5. Pt will be independent with HEP and self management of symptoms.  (progressing, not met)   6. Pt to demonstrate improved functional ability with FOTO limitation <=25% disability.  (progressing, not met)     Plan     Continue with established PT Plan of Care and focus on hip ROM, strengthening, and manual therapy.    Kathi John, PT

## 2019-03-22 NOTE — PROGRESS NOTES
Dry Needling Daily Note     Patient ID: Adriana Cardozo is a 43 y.o. female.  Diagnosis:   1. Left hip pain     2. Decreased strength, endurance, and mobility       Date:  03/22/2019    Start Time:  10:10  Stop Time:  10:25    Subjective:     Pt reports: increased L hip pain x 1 month.  Pain Scale: Adriana rates pain on a scale of 0-10 to be 7 currently.    Objective:     Pt signed written consent to dry needling Rx.  Pt gave verbal consent for DN.   Pt rec'd dry needling to L hip with 3 in needles with no adverse effects.    Homeostatic points:  1. Deep Radial  2. Greater Auricular  3. Spinal Accessory  4. Saphenous  5. Deep Fibular  6. Tibial  7. Greater Occipital  8. Suprascapular ( infraspinatus)  9. Lateral Antebrachial Cutaneous  10. Sural  11. Lateral Popliteal  12. Superficial Radial  13. Dorsal Scapular  14. Superior Cluneal  15. Posterior Cutaneous L 2  16. Inferior Gluteal  17. Lateral Pectoral  18. Ilitotibal  19. Infraorbital  20. Spinous process T7  21. Posterior cutaneous  T6  22. Posterior cutaneous L 5  23. Supraorbital  24. Common fibular    Paravertebral Points:  L4    Symptomatic Points:   TFL, glutes med, min, piriformis, lateral hamstring muscle belly     Assessment:     Patient demonstrated appropriate response to FDN. Increased ST dysfunction noted to lower LSP, lateral hamstring muscle belly, and gastroc. Winding technique used in 5 min increments to reduce muscle tension and relieve pain. Pt noted significant improvement in posture, walking tolerance, and pain intensity at end of session.    Patient Education/Response:     Education provided re:   Purpose, benefits, and potential side effects of dry needling.   Educated pt to use heat following treatment sessions to reduce c/o pain or soreness and to improve circulation to needled sites.   Encouraged pt to continue with HEP to maintain flexibility, ROM, and functional mobility.  Adriana verbalized good understanding of education      Plans and Goals:     Monitor response to FDN. Continue with FDN in POC as tolerated.     Kathi John, PT  3/22/2019

## 2019-03-26 NOTE — PROGRESS NOTES
"  Physical Therapy Daily Treatment Note     Name: Adriana HERNANDEZ [facility] Number: [id number]    Therapy Diagnosis:   Encounter Diagnoses   Name Primary?    Left hip pain     Decreased strength, endurance, and mobility      Physician: Bar Chaidez III, *    Visit Date: 3/27/2019    Physician Orders: PT Eval and Treat - treat bilaterally with focus on left   Medical Diagnosis:   M25.552 (ICD-10-CM) - Left hip pain   G57.02 (ICD-10-CM) - Piriformis syndrome of left side     Evaluation Date: 3/13/2019  Authorization Period Expiration: 12/31/2019  Plan of Care Certification Period: 6/13/2019  Visit #/Visits authorized: 4/20    Time In: 11:05 AM  Time Out: 12:00 PM  Total Billable Time: 55 minutes    Precautions: Standard    Subjective     Pt reports: she no longer has lateral glute pain. Her c/c today is more "tension" in the back L thigh and superior calf. She attempts to stretch the calf and hamstrings, which helps somewhat.  She was compliant with home exercise program.  Response to previous treatment: fine, relief from dry needling  Functional change: improved tolerance with walking    Pain: 3/10  Location: left buttocks, L lateral knee, gastroc    Objective     Adriana received therapeutic exercises to develop strength, endurance, ROM, flexibility, posture and core stabilization for 30 minutes including:    Glute sets 20 x 5"  Bridges 2 x 10  Hip adduction ball squeeze 20 x 5"  SLR 2 x 10  SL clamshells 20 x 5"  SL hip abd 2 x 10  Figure 4 IR/ER stretch 3 x 30"  Piriformis stretch 3 x 30"  Hamstring stretch 2 x 1' (manually by PTA) - changed to using strap today  Gastroc SB stretch 2 x 1' -- change to incline board today    Adriana received the following manual therapy techniques:   25 min x dry needling - see note by Kathi Lema, PT   00 min x Myofacial release, Soft tissue Mobilization and Friction Massage were applied to the: L hip, including: Rolling stick to ITB, gluteals, gastroc region    Adriana " received hot pack for 10 minutes to glutes, gastroc --- performed simultaneous with therex today      Home Exercises Provided and Patient Education Provided     Education provided:   - rationale behind each ex, proper form and technique    Written Home Exercises Provided: Patient instructed to cont prior HEP.  Exercises were reviewed and Adriana was able to demonstrate them prior to the end of the session.  Adriana demonstrated good  understanding of the education provided.     See EMR under Media for exercises provided prior visit.    Assessment     Decreased MF mobility of lateral HS and gastroc with TTP into lateral calf. Able to resume several LE strengthening exercises today with improved tolerance.  Adriana is progressing well towards her goals.   Pt prognosis is Guarded.     Pt will continue to benefit from skilled outpatient physical therapy to address the deficits listed in the problem list box on initial evaluation, provide pt/family education and to maximize pt's level of independence in the home and community environment.     Pt's spiritual, cultural and educational needs considered and pt agreeable to plan of care and goals.     Anticipated barriers to physical therapy: financial consideration     Goals:     Short Term Goals (4 Weeks):  1. 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 (progressing, not met)  2.  Pt able to transfer in/out of chairs of various heights with <5/10 pain . (progressing, not met)   3. Pt able to sleep >4 hours without pain disturbance. (progressing, not met)   4. Pt to demonstrate improved functional ability with FOTO limitation <=35% disability. (progressing, not met)     Long Term Goals (8 Weeks):  1. Pt able to sit >= 1 hour with CPU work with <3/10 pain. (progressing, not met)   2. Pt is independent with all bed mobility. (progressing, not met)   3. Pt able to sleep a full night without pain disturbance. (progressing, not met)   4. Pt able to return to full  work / recreational activities with min difficulty and pain <3/10. (progressing, not met)   5. Pt will be independent with HEP and self management of symptoms.  (progressing, not met)   6. Pt to demonstrate improved functional ability with FOTO limitation <=25% disability.  (progressing, not met)     Plan     Continue with established PT Plan of Care and focus on hip ROM, strengthening, and manual therapy.    Kathi John, PT

## 2019-03-27 ENCOUNTER — CLINICAL SUPPORT (OUTPATIENT)
Dept: REHABILITATION | Facility: OTHER | Age: 44
End: 2019-03-27
Payer: COMMERCIAL

## 2019-03-27 DIAGNOSIS — R68.89 DECREASED STRENGTH, ENDURANCE, AND MOBILITY: ICD-10-CM

## 2019-03-27 DIAGNOSIS — Z74.09 DECREASED STRENGTH, ENDURANCE, AND MOBILITY: ICD-10-CM

## 2019-03-27 DIAGNOSIS — R53.1 DECREASED STRENGTH, ENDURANCE, AND MOBILITY: ICD-10-CM

## 2019-03-27 DIAGNOSIS — M25.552 LEFT HIP PAIN: ICD-10-CM

## 2019-03-27 PROCEDURE — 97140 MANUAL THERAPY 1/> REGIONS: CPT | Mod: PN

## 2019-03-27 PROCEDURE — 97110 THERAPEUTIC EXERCISES: CPT | Mod: PN

## 2019-03-27 NOTE — PROGRESS NOTES
Dry Needling Daily Note     Patient ID: Adriana Cardozo is a 43 y.o. female.  Diagnosis:   1. Left hip pain     2. Decreased strength, endurance, and mobility       Date:  03/27/2019    Start Time:  10:10  Stop Time:  10:35    Subjective:     Pt reports: continued L posterior hamstring and calf pain today.  Pain Scale: Adriana rates pain on a scale of 0-10 to be 3 currently.    Objective:     Pt signed written consent to dry needling Rx.  Pt gave verbal consent for DN.   Pt rec'd dry needling to L hip with 3 in needles with no adverse effects.    Homeostatic points:  1. Deep Radial  2. Greater Auricular  3. Spinal Accessory  4. Saphenous  5. Deep Fibular  6. Tibial  7. Greater Occipital  8. Suprascapular ( infraspinatus)  9. Lateral Antebrachial Cutaneous  10. Sural  11. Lateral Popliteal  12. Superficial Radial  13. Dorsal Scapular  14. Superior Cluneal  15. Posterior Cutaneous L 2  16. Inferior Gluteal  17. Lateral Pectoral  18. Ilitotibal  19. Infraorbital  20. Spinous process T7  21. Posterior cutaneous  T6  22. Posterior cutaneous L 5  23. Supraorbital  24. Common fibular    Paravertebral Points:  L4    Symptomatic Points:   TFL, glutes med, min, piriformis, lateral hamstring muscle belly, lateral gastroc head    Assessment:     Patient demonstrated appropriate response to FDN. Increased ST dysfunction noted to lateral gastroc and ITB. Increased ease with SLR following DN today.    Patient Education/Response:     Education provided re:   Purpose, benefits, and potential side effects of dry needling.   Educated pt to use heat following treatment sessions to reduce c/o pain or soreness and to improve circulation to needled sites.   Encouraged pt to continue with HEP to maintain flexibility, ROM, and functional mobility.  Adriana verbalized good understanding of education     Plans and Goals:     Monitor response to FDN. Continue with FDN in POC as tolerated.     Kathi John, PT  3/27/2019

## 2019-03-29 ENCOUNTER — CLINICAL SUPPORT (OUTPATIENT)
Dept: REHABILITATION | Facility: OTHER | Age: 44
End: 2019-03-29
Payer: COMMERCIAL

## 2019-03-29 DIAGNOSIS — R53.1 DECREASED STRENGTH, ENDURANCE, AND MOBILITY: ICD-10-CM

## 2019-03-29 DIAGNOSIS — Z74.09 DECREASED STRENGTH, ENDURANCE, AND MOBILITY: ICD-10-CM

## 2019-03-29 DIAGNOSIS — R68.89 DECREASED STRENGTH, ENDURANCE, AND MOBILITY: ICD-10-CM

## 2019-03-29 DIAGNOSIS — M25.552 LEFT HIP PAIN: ICD-10-CM

## 2019-03-29 PROCEDURE — 97110 THERAPEUTIC EXERCISES: CPT | Mod: PN

## 2019-03-29 NOTE — PROGRESS NOTES
"  Physical Therapy Daily Treatment Note     Name: Adriana HERNANDEZ [facility] Number: [id number]    Therapy Diagnosis:   Encounter Diagnoses   Name Primary?    Left hip pain     Decreased strength, endurance, and mobility      Physician: Bar Chaidez III, *    Visit Date: 3/29/2019    Physician Orders: PT Eval and Treat - treat bilaterally with focus on left   Medical Diagnosis:   M25.552 (ICD-10-CM) - Left hip pain   G57.02 (ICD-10-CM) - Piriformis syndrome of left side     Evaluation Date: 3/13/2019  Authorization Period Expiration: 12/31/2019  Plan of Care Certification Period: 6/13/2019  Visit #/Visits authorized: 5/20    Time In: 8:00 AM  Time Out: 8:45 PM  Total Billable Time: 35 minutes    Precautions: Standard    Subjective     Pt reports: stiffness in the AM "that's why i'm walking funny." She says that she has to leave early to go to work.  She was compliant with home exercise program.  Response to previous treatment: fine, relief from dry needling  Functional change: improved tolerance with walking    Pain: 3/10  Location: left buttocks, L lateral knee, gastroc    Objective     Adriana received therapeutic exercises to develop strength, endurance, ROM, flexibility, posture and core stabilization for 35 minutes including:    Glute sets 20 x 5"  Bridges 2 x 10  Hip adduction ball squeeze 20 x 5"  SLR 2 x 10  SL clamshells 20 x 5"  SL hip abd 2 x 10  Figure 4 IR/ER stretch 3 x 30"  Piriformis stretch 3 x 30"  Hamstring stretch 2 x 1' (manually by PTA) - changed to using strap today  Gastroc SB stretch 2 x 1' -- change to incline board today    Adriana received the following manual therapy techniques:   00 min x dry needling - see note by Kathi Lema, PT   00 min x Myofacial release, Soft tissue Mobilization and Friction Massage were applied to the: L hip, including: Rolling stick to ITB, gluteals, gastroc region    Adriana received hot pack for 10 minutes to glutes, gastroc --- performed at end of " therapy session today    Home Exercises Provided and Patient Education Provided     Education provided:   - rationale behind each ex, proper form and technique    Written Home Exercises Provided: Patient instructed to cont prior HEP.  Exercises were reviewed and Adriana was able to demonstrate them prior to the end of the session.  Adriana demonstrated good  understanding of the education provided.     See EMR under Media for exercises provided prior visit.    Assessment     Limited progression of therex due to time constraints. Pt tolerated session well with improved ambulation by end of session. Progress trunk and hip strengthening next visit.    Adriana is progressing well towards her goals.   Pt prognosis is Guarded.     Pt will continue to benefit from skilled outpatient physical therapy to address the deficits listed in the problem list box on initial evaluation, provide pt/family education and to maximize pt's level of independence in the home and community environment.     Pt's spiritual, cultural and educational needs considered and pt agreeable to plan of care and goals.     Anticipated barriers to physical therapy: financial consideration     Goals:     Short Term Goals (4 Weeks):  1. 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 (progressing, not met)  2.  Pt able to transfer in/out of chairs of various heights with <5/10 pain . (progressing, not met)   3. Pt able to sleep >4 hours without pain disturbance. (progressing, not met)   4. Pt to demonstrate improved functional ability with FOTO limitation <=35% disability. (progressing, not met)     Long Term Goals (8 Weeks):  1. Pt able to sit >= 1 hour with CPU work with <3/10 pain. (progressing, not met)   2. Pt is independent with all bed mobility. (progressing, not met)   3. Pt able to sleep a full night without pain disturbance. (progressing, not met)   4. Pt able to return to full work / recreational activities with min difficulty and pain  <3/10. (progressing, not met)   5. Pt will be independent with HEP and self management of symptoms.  (progressing, not met)   6. Pt to demonstrate improved functional ability with FOTO limitation <=25% disability.  (progressing, not met)     Plan     Continue with established PT Plan of Care and focus on hip ROM, strengthening, and manual therapy.    Kathi John, PT

## 2019-04-05 ENCOUNTER — CLINICAL SUPPORT (OUTPATIENT)
Dept: REHABILITATION | Facility: OTHER | Age: 44
End: 2019-04-05
Payer: COMMERCIAL

## 2019-04-05 DIAGNOSIS — M25.552 LEFT HIP PAIN: ICD-10-CM

## 2019-04-05 DIAGNOSIS — Z74.09 DECREASED STRENGTH, ENDURANCE, AND MOBILITY: ICD-10-CM

## 2019-04-05 DIAGNOSIS — R53.1 DECREASED STRENGTH, ENDURANCE, AND MOBILITY: ICD-10-CM

## 2019-04-05 DIAGNOSIS — R68.89 DECREASED STRENGTH, ENDURANCE, AND MOBILITY: ICD-10-CM

## 2019-04-05 PROCEDURE — 97110 THERAPEUTIC EXERCISES: CPT | Mod: PN

## 2019-04-05 NOTE — PROGRESS NOTES
"  Physical Therapy Daily Treatment Note     Name: Adriana HERNANDEZ [facility] Number: [id number]    Therapy Diagnosis:   Encounter Diagnoses   Name Primary?    Left hip pain     Decreased strength, endurance, and mobility      Physician: Bar Chaidez III, *    Visit Date: 4/5/2019    Physician Orders: PT Eval and Treat - treat bilaterally with focus on left   Medical Diagnosis:   M25.552 (ICD-10-CM) - Left hip pain   G57.02 (ICD-10-CM) - Piriformis syndrome of left side     Evaluation Date: 3/13/2019  Authorization Period Expiration: 12/31/2019  Plan of Care Certification Period: 6/13/2019  Visit #/Visits authorized: 6/20    Time In: 10:00 AM  Time Out: 11:00 AM  Total Billable Time: 45 minutes    Precautions: Standard    Subjective     Pt reports: no stiffness this AM. "I feel like I am genuinely getting better."  She was compliant with home exercise program.  Response to previous treatment: fine, relief from dry needling  Functional change: improved tolerance with walking    Pain: 1-2/10   Location: left buttocks, L lateral knee, gastroc    Objective     Adriana received therapeutic exercises to develop strength, endurance, ROM, flexibility, posture and core stabilization for 35 minutes including:    Glute sets 20 x 5"  Bridges 3 x 10  Hip adduction ball squeeze 20 x 5"  SLR 3 x 10  SL clamshells 20 x 5" w/ OTB   SL hip abd 3 x 10  Figure 4 IR/ER stretch 3 x 30"  Piriformis stretch 3 x 30"  Hamstring stretch 2 x 1' (manually by PTA) - changed to using strap today  Gastroc SB stretch 2 x 1' -- change to incline board today  +heel raise 2 x 10    Adriana received the following manual therapy techniques:   00 min x dry needling - see note by Kathi Lema, PT   5 min x Myofacial release, Soft tissue Mobilization and Friction Massage were applied to the: L hip, including: Rolling stick to ITB, gluteals, gastroc region    Adriana received hot pack for 10 minutes to glutes, gastroc --- performed at end of therapy " session today    Home Exercises Provided and Patient Education Provided     Education provided:   - rationale behind each ex, proper form and technique    Written Home Exercises Provided: Patient instructed to cont prior HEP.  Exercises were reviewed and Adriana was able to demonstrate them prior to the end of the session.  Adriana demonstrated good  understanding of the education provided.     See EMR under Media for exercises provided prior visit.    Assessment     Pt tolerated progression of resistance / reps without reports of increased pain. Muscular fatigue noted and pt required rest breaks to complete.     Adrinaa is progressing well towards her goals.   Pt prognosis is Guarded.     Pt will continue to benefit from skilled outpatient physical therapy to address the deficits listed in the problem list box on initial evaluation, provide pt/family education and to maximize pt's level of independence in the home and community environment.     Pt's spiritual, cultural and educational needs considered and pt agreeable to plan of care and goals.     Anticipated barriers to physical therapy: financial consideration     Goals:     Short Term Goals (4 Weeks):  1. 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 (progressing, not met)  2.  Pt able to transfer in/out of chairs of various heights with <5/10 pain . (progressing, not met)   3. Pt able to sleep >4 hours without pain disturbance. (progressing, not met)   4. Pt to demonstrate improved functional ability with FOTO limitation <=35% disability. (progressing, not met)     Long Term Goals (8 Weeks):  1. Pt able to sit >= 1 hour with CPU work with <3/10 pain. (progressing, not met)   2. Pt is independent with all bed mobility. (progressing, not met)   3. Pt able to sleep a full night without pain disturbance. (progressing, not met)   4. Pt able to return to full work / recreational activities with min difficulty and pain <3/10. (progressing, not met)   5.  Pt will be independent with HEP and self management of symptoms.  (progressing, not met)   6. Pt to demonstrate improved functional ability with FOTO limitation <=25% disability.  (progressing, not met)     Plan     Continue with established PT Plan of Care and focus on hip ROM, strengthening, and manual therapy.    Salinas Gonsales, PTA

## 2019-04-12 ENCOUNTER — CLINICAL SUPPORT (OUTPATIENT)
Dept: REHABILITATION | Facility: OTHER | Age: 44
End: 2019-04-12
Payer: COMMERCIAL

## 2019-04-12 DIAGNOSIS — R68.89 DECREASED STRENGTH, ENDURANCE, AND MOBILITY: ICD-10-CM

## 2019-04-12 DIAGNOSIS — M25.552 LEFT HIP PAIN: ICD-10-CM

## 2019-04-12 DIAGNOSIS — R53.1 DECREASED STRENGTH, ENDURANCE, AND MOBILITY: ICD-10-CM

## 2019-04-12 DIAGNOSIS — Z74.09 DECREASED STRENGTH, ENDURANCE, AND MOBILITY: ICD-10-CM

## 2019-04-12 PROCEDURE — 97110 THERAPEUTIC EXERCISES: CPT | Mod: PN

## 2019-04-24 ENCOUNTER — DOCUMENTATION ONLY (OUTPATIENT)
Dept: REHABILITATION | Facility: OTHER | Age: 44
End: 2019-04-24

## 2019-05-01 ENCOUNTER — DOCUMENTATION ONLY (OUTPATIENT)
Dept: REHABILITATION | Facility: OTHER | Age: 44
End: 2019-05-01

## 2019-05-08 ENCOUNTER — DOCUMENTATION ONLY (OUTPATIENT)
Dept: REHABILITATION | Facility: OTHER | Age: 44
End: 2019-05-08

## 2019-05-09 ENCOUNTER — DOCUMENTATION ONLY (OUTPATIENT)
Dept: REHABILITATION | Facility: OTHER | Age: 44
End: 2019-05-09

## 2019-05-09 DIAGNOSIS — Z74.09 DECREASED STRENGTH, ENDURANCE, AND MOBILITY: ICD-10-CM

## 2019-05-09 DIAGNOSIS — R53.1 DECREASED STRENGTH, ENDURANCE, AND MOBILITY: ICD-10-CM

## 2019-05-09 DIAGNOSIS — M25.552 LEFT HIP PAIN: ICD-10-CM

## 2019-05-09 DIAGNOSIS — R68.89 DECREASED STRENGTH, ENDURANCE, AND MOBILITY: ICD-10-CM

## 2019-05-09 NOTE — PROGRESS NOTES
REHAB SERVICES OUTPATIENT DISCHARGE SUMMARY  Physical Therapy      Name:  Adriana Cardozo  Date:  5/9/2019    Date of Evaluation:  03/13/2019  Physician: Bar Chaidez III, PA-C  Total # Of Visits:  8   Diagnosis:    Encounter Diagnoses   Name Primary?    Left hip pain     Decreased strength, endurance, and mobility          Physical/Functional Status:  Unable to assess pt's functional limitations and current impairments due to pt not returning to the clinic.     The patient is to be discharged from our Therapy service for the following reason(s):  Patient has not attended therapy since 04/12/2019    Degree of Goal Achievement: Patient has not met goals secondary to:  Not returning to clinic for follow up assessment.    Patient Education: None provided    Discharge Plan:  D/C due to non-compliance for remaining visit.    Kathi John, PT  5/9/2019

## 2019-11-18 ENCOUNTER — OFFICE VISIT (OUTPATIENT)
Dept: URGENT CARE | Facility: CLINIC | Age: 44
End: 2019-11-18
Payer: COMMERCIAL

## 2019-11-18 VITALS
TEMPERATURE: 98 F | WEIGHT: 201 LBS | SYSTOLIC BLOOD PRESSURE: 144 MMHG | RESPIRATION RATE: 18 BRPM | BODY MASS INDEX: 31.55 KG/M2 | OXYGEN SATURATION: 97 % | DIASTOLIC BLOOD PRESSURE: 90 MMHG | HEART RATE: 72 BPM | HEIGHT: 67 IN

## 2019-11-18 DIAGNOSIS — J45.41 MODERATE PERSISTENT ASTHMA WITH ACUTE EXACERBATION: Primary | ICD-10-CM

## 2019-11-18 DIAGNOSIS — Z76.89 ENCOUNTER TO ESTABLISH CARE: ICD-10-CM

## 2019-11-18 PROBLEM — F32.A DEPRESSION: Status: ACTIVE | Noted: 2018-09-30

## 2019-11-18 PROBLEM — F41.9 ANXIETY: Status: ACTIVE | Noted: 2018-09-30

## 2019-11-18 PROCEDURE — 94640 PR INHAL RX, AIRWAY OBST/DX SPUTUM INDUCT: ICD-10-PCS | Mod: S$GLB,,, | Performed by: FAMILY MEDICINE

## 2019-11-18 PROCEDURE — 94640 AIRWAY INHALATION TREATMENT: CPT | Mod: S$GLB,,, | Performed by: FAMILY MEDICINE

## 2019-11-18 PROCEDURE — 99214 OFFICE O/P EST MOD 30 MIN: CPT | Mod: 25,S$GLB,, | Performed by: FAMILY MEDICINE

## 2019-11-18 PROCEDURE — 99214 PR OFFICE/OUTPT VISIT, EST, LEVL IV, 30-39 MIN: ICD-10-PCS | Mod: 25,S$GLB,, | Performed by: FAMILY MEDICINE

## 2019-11-18 RX ORDER — ALBUTEROL SULFATE 0.83 MG/ML
2.5 SOLUTION RESPIRATORY (INHALATION)
Status: COMPLETED | OUTPATIENT
Start: 2019-11-18 | End: 2019-11-18

## 2019-11-18 RX ORDER — ALBUTEROL SULFATE 90 UG/1
2 AEROSOL, METERED RESPIRATORY (INHALATION) EVERY 6 HOURS PRN
Qty: 1 EACH | Refills: 11 | Status: SHIPPED | OUTPATIENT
Start: 2019-11-18

## 2019-11-18 RX ORDER — BUDESONIDE AND FORMOTEROL FUMARATE DIHYDRATE 160; 4.5 UG/1; UG/1
2 AEROSOL RESPIRATORY (INHALATION) 2 TIMES DAILY
Qty: 1 INHALER | Refills: 12 | Status: SHIPPED | OUTPATIENT
Start: 2019-11-18 | End: 2019-12-18

## 2019-11-18 RX ORDER — IPRATROPIUM BROMIDE 0.5 MG/2.5ML
0.5 SOLUTION RESPIRATORY (INHALATION)
Status: COMPLETED | OUTPATIENT
Start: 2019-11-18 | End: 2019-11-18

## 2019-11-18 RX ORDER — PREDNISONE 10 MG/1
TABLET ORAL
Qty: 15 TABLET | Refills: 0 | Status: SHIPPED | OUTPATIENT
Start: 2019-11-18 | End: 2023-07-27

## 2019-11-18 RX ADMIN — IPRATROPIUM BROMIDE 0.5 MG: 0.5 SOLUTION RESPIRATORY (INHALATION) at 04:11

## 2019-11-18 RX ADMIN — ALBUTEROL SULFATE 2.5 MG: 0.83 SOLUTION RESPIRATORY (INHALATION) at 04:11

## 2019-11-18 NOTE — PATIENT INSTRUCTIONS
"  Asthma (Adult)  Asthma is a disease where the medium and  small air passages within the lung go into spasm and restrict the flow of air. Inflammation and swelling of the airways cause further restriction. During an acute asthma attack, these factors cause difficulty breathing, wheezing, cough and chest tightness.    An asthma attack can be triggered by many things. Common triggers include infections such as the common cold, bronchitis, pneumonia. Irritants such as smoke or pollutants in the air, emotional upset, and exercise can also trigger an attack. In many adults with asthma, allergies to dust, mold, pollen and animal dander can cause an asthma attack. Skipping doses of daily asthma medicine can also bring on an asthma attack.  Asthma can be controlled using the proper medicines prescribed by your healthcare provider and avoiding exposure to known triggers including allergens and irritants.  Home care  · Take prescribed medicine exactly at the times advised. If you need medicine such as from a hand held inhaler or aerosol breathing machine more than every 4 hours, contact your healthcare provider or seek immediate medical attention. If prescribed an antibiotic or prednisone, take all of the medicine as prescribed, even if you are feeling better after a few days.  · Do not smoke. Avoid being exposed to the smoke of others.  · Some people with asthma have worsening of their symptoms when they take aspirin and non-steroidal or fever-reducing medicines like ibuprofen and naproxen. Talk to your healthcare provider if you think this may apply to you.  Follow-up care  Follow up with your healthcare provider, or as advised. Always bring all of your current medicines to any appointments with your healthcare provider. Also bring a complete list of medications even those not taken for asthma. If you do not already have one, talk to your healthcare provider about developing a personalized "Asthma Action Plan."  A " pneumococcal (pneumonia) vaccine and yearly flu shot (every fall) are recommended. Ask your doctor about this.  When to seek medical advice  Call your healthcare provider right away if any of these occur:   · Increased wheezing or shortness of breath  · Need to use your inhalers more often than usual without relief  · Fever of 100.4ºF (38ºC) or higher, or as directed by your healthcare provider  · Coughing up lots of dark-colored or bloody sputum (mucus)  · Chest pain with each breath  · If you use a peak flow meter as part of an Asthma Action Plan, and you are still in the yellow zone (50% to 80%) 15 minutes after using inhaler medicine.  Call 911  Call 911 if any of the following occur  · Trouble walking or talking because of shortness of breath  · If you use a peak flow meter as part of an Asthma Action Plan and you are still in the red zone (less than 50%) 15 minutes after using inhaler medicine  · Lips or fingernails turning gray or blue  Date Last Reviewed: 12/2/2015  © 9063-7141 [employer]. [address] [place] [address]. All rights reserved. This information is not intended as a substitute for professional medical care. Always follow your healthcare professional's instructions.        Understanding Asthma    Asthma causes swelling and narrowing of the airways in your lungs. Medical experts are not exactly sure what causes asthma. It is believed to be caused by a mix of inherited and environmental factors.  Healthy lungs  Inside the lungs there are branching airways made of stretchy tissue. Each airway is wrapped with bands of muscle. The airways become more narrow as they go deeper into the lungs. The smallest airways end in clusters of tiny balloon-like air sacs (alveoli). These clusters are surrounded by blood vessels. When you breathe in (inhale), air enters the lungs. It travels down through the airways until it reaches the air sacs. When you breathe out (exhale), air travels up  through the airways and out of the lungs. The airways produce mucus that traps particles you breathe in. Normally, the mucus is then swept out of the lungs by tiny hairs (cilia) that line the airways. The mucus is swallowed or coughed up.  What the lungs do  The air you inhale contains oxygen. When oxygen reaches the air sacs, it passes into the blood vessels surrounding the sacs. Your blood then delivers oxygen to all of your cells. As you exhale, carbon dioxide is removed in a similar way from the blood in the air sacs, and from the body.  When you have asthma  People with asthma have very sensitive airways. This means the airways react to certain things called triggers (such as pollen, dust, or smoke) and become swollen and narrowed. Inflammation makes the airways swollen and narrowed. This is a long-lasting (chronic) problem. The airways may not always be narrowed enough to notice breathing problems.  Symptoms of chronic inflammation:   · Coughing  · Chest tightness  · Shortness of breath  · Wheezing (a whistling noise, especially when breathing out)  · Low energy or feeling tired  In some people, over time chronic mild inflammation can lead to lasting (permanent) scarring of airways and loss of lung function.  Moderate flare-ups  When sensitive airways are irritated by a trigger, the muscles around the airways tighten. The lining of the airways swells. Thick, sticky mucus increases and partly clogs the airways. All of this makes you work harder to keep breathing.  Symptoms of moderate flare-ups:  · Coughing, especially at night  · Getting tired or out of breath easily  · Wheezing  · Chest tightness  · Faster breathing when at rest  Severe flare-ups  Severe flare-ups are life-threatening. In a severe flare-up, the muscle tightening, swelling, and mucus production are even worse. Its very hard to breathe. Your body can't get enough oxygen and can't remove carbon dioxide. Waste gas is trapped in the alveoli, and  gas exchange cant occur. The body is not getting enough oxygen. Without oxygen, body tissues, especially brain tissue, begin to get damaged. If this goes on for long, it can lead to severe brain damage or death.  Call 911 (or have someone call for you) if you have any of these symptoms and they are not relieved right away by taking your quick-relief medicine as prescribed:  · Severe trouble breathing  · Too short of breath to talk or walk  · Lips or fingers turning blue  · Feeling lightheaded or dizzy, as though you are about to pass out  · Peak flow less than 50% of your personal best, if you use peak flow monitoring  Asthma is a long-term condition. So its important to work with your healthcare provider to manage it. If you smoke, get help to quit. Know your triggers and figure out how to avoid them. Its also very important to take your medicines as directed. That means taking them even when you feel good.  Date Last Reviewed: 12/1/2016  © 3577-1521 [employer]. [address] [place] [address]. All rights reserved. This information is not intended as a substitute for professional medical care. Always follow your healthcare professional's instructions.        Inhaler Use  The inhaler that you were prescribed contains a potent medicine. It should only be used as directed. The medicine in your inhaler must be breathed deeply into your lungs for it to work. It will not work at all if it only reaches your mouth and throat. Follow the instructions below for best results. And remember to follow your asthma action plan as given to you by your doctor.  1. Keep your inhaler at room temperature.  2. Hold the inhaler so that the part that goes into your mouth is at the bottom.  3. Shake the inhaler well and remove the cap.  4. Breathe out through your mouth to fully empty your lungs.  5. Place the inhaler in your mouth and close your lips tightly around it. (Or hold the inhaler 1 to 2 inches from  your open mouth if told to do so by your healthcare provider.)  6. Squeeze the inhaler as you breathe in slowly through your mouth until your lungs are full of air, drawing the medicine deep into your lungs.  7. Hold your breath for 10 seconds, or as long as you can comfortably hold your breath. Then breathe out slowly.  8. If you have been advised to take 2 puffs, wait 5 minutes, then repeat steps 3-7 above. Waiting 5 minutes between puffs will alow the medicine to open up your lungs so the second puff can get deeper into the lungs. Replace the cap when done.  9. If you were prescribed both a steroid inhaler and a bronchodilator inhaler, use the bronchodilator first to open the air passages. Wait 5 minutes, then use the steroid inhaler.  10. Rinse your mouth with water and spit it out (especially after using a steroid inhaler). This is very important if you are using a steroid inhaler to prevent thrush, a mild yeast infection of the mouth and back of the throat.  11. A special chamber (spacer) may be prescribed that attaches to your inhaler. This increases the amount of medicine that goes to your lungs. It also improves how well each treatment works. Ask your doctor about this if you did not receive one.    Keep it clean  Remove the metal canister and do not immerse it in water. Then clean the plastic mouthpiece, cap, and spacer if you have one, by rinsing them well in warm running water for 30 to 60 seconds. Shake off excess water and allow the mouthpiece to dry completely (overnight is recommended). This should be done once a week. If you need the inhaler before the mouthpiece is dry, shake off excess water, replace canister, and test spray 2 times (away from the face).  Warning  A steroid inhaler is used to prevent an asthma attack. Do not use this to treat an acute wheezing episode. Use only bronchodilator inhalers (quick relief) to treat an acute asthma attack.  If you find that your medicine is not working  and you need to use it more often than prescribed, this could be a sign that your asthma is getting worse. Go to the emergency room or urgent care right away. An asthma attack is easiest to treat in the early stages before it becomes severe.  When to seek medical advice  Get prompt medical attention if any of the following occur:  · Increased wheezing or shortness of breath  · Need to use your inhalers more often than usual without relief  · Fever of 100.4°F (38°C) or higher, or as directed by your healthcare provider  · Coughing up lots of dark-colored or bloody sputum (mucus)  · Chest pain with each breath  · Blue lips or fingernails  · Peak flow reading less than 50% of your normal best  Date Last Reviewed: 12/2/2015  © 3731-6518 [employer]. [address] [place] [address], [place] [address]. All rights reserved. This information is not intended as a substitute for professional medical care. Always follow your healthcare professional's instructions.        Controlling Other Triggers  Many things can trigger symptoms in people with respiratory conditions like asthma or COPD. They may be allergens such as mold, pollens, or dust. Or they may be irritants such as smoke or strong odors. You may find there are things that trigger symptoms that arent allergens or irritants. These include weather changes, illness, stress, and exercise. The tips below can help to ease your symptoms.  Weather  Certain weather conditions can trigger symptoms. Or they can make other triggers worse.  · Keep track of weather conditions that affect you. Very high or low temperatures, or high humidity, can make symptoms worse. For some people major changes in weather can be a trigger.  · Limit outdoor activity during the type of weather that affects you.  · Wear a scarf over your mouth and nose in cold weather.  Colds, flu, and sinus infections    Illnesses that affect the nose, throat, and sinuses can irritate your lungs. These illnesses  are called upper respiratory infections. They can cause asthma flare-ups.  · Wash your hands often with soap and warm water. Or use an alcohol-based hand .  · Get a yearly flu shot. And talk with your healthcare provider about whether you should get the pneumonia vaccine.  · Take care of your general health. Get plenty of sleep. And eat a healthy, balanced diet with lots of fruit and vegetables.  Food additives  Food additives can trigger asthma flare-ups in some people.  · Check food labels for sulfites, metabisulfites, and sulfur dioxide. These are often found in foods such as wine, beer, and dried fruits.  · Do not eat foods that contain these additives if they trigger your asthma.  Medicines  Some medicines may cause symptoms in some people with asthma. These include aspirin, nonsteroidal anti-inflammatory drugs (NSAIDs) such as ibuprofen and naproxen, and some beta-blockers.  · Tell your healthcare provider if you think certain medicines trigger symptoms.  · Make sure to read the labels on over-the-counter medicines. They may have ingredients that cause symptoms for you.  Emotions  Laughing, crying, or feeling excited are triggers for some people.  · Try this breathing exercise to stay calm: Start by breathing in slowly through your nose for a count of 2 seconds. Then close your lips and breathe out for a count of 4 seconds.  · Try to focus on a soothing image in your mind. This will help relax you and calm your breathing.  · Remember to take your daily controller medicines. When you are upset or under stress, its easy to forget.  Exercise  For some people, exercise can trigger symptoms. Dont let this stop you from being active. As you know, exercise is good for both your overall health and your lung health. It also strengthens the heart and blood vessels. It also may lower your sensitivity to triggers. These tips and your healthcare provider's advice can help:  · If you have not been exercising  regularly, start slow and do more gradually.  · Take all of your medicines as prescribed.  · If you use quick-relief medicine, make sure you have it with you when you exercise.   · Ask your healthcare provider if you might benefit from taking your quick-relief medicine before you exercise.  · Stop if you have any symptoms. Make sure you talk with your healthcare provider about these symptoms.  Date Last Reviewed: 12/1/2016  © 0895-2412 [employer]. [address] [place] [address], [place] [address]. All rights reserved. This information is not intended as a substitute for professional medical care. Always follow your healthcare professional's instructions.

## 2019-11-18 NOTE — PROGRESS NOTES
"Subjective:       Patient ID: Adriana Cardozo is a 44 y.o. female.    Vitals:  height is 5' 7" (1.702 m) and weight is 91.2 kg (201 lb). Her temperature is 98.2 °F (36.8 °C). Her blood pressure is 144/90 (abnormal) and her pulse is 72. Her respiration is 18 and oxygen saturation is 97%.     Chief Complaint: Cough    Pt c/o fever, runny nose, productive cough, sore throat ,fatigue that began last Friday.    Cough   This is a new problem. The current episode started 1 to 4 weeks ago. The problem has been unchanged. The problem occurs every few minutes. The cough is productive of sputum. Associated symptoms include a fever and a sore throat. Pertinent negatives include no chills, ear pain, eye redness, hemoptysis, myalgias, rash, shortness of breath or wheezing. Treatments tried: mucinex dm, thuraflu, nyquil, dayquil,flonase. The treatment provided mild relief. Her past medical history is significant for environmental allergies.       Constitution: Positive for fatigue and fever. Negative for chills and sweating.   HENT: Positive for sore throat. Negative for ear pain, congestion, sinus pain, sinus pressure and voice change.    Neck: Negative for painful lymph nodes.   Eyes: Negative for eye redness.   Respiratory: Positive for cough and sputum production. Negative for chest tightness, bloody sputum, COPD, shortness of breath, stridor, wheezing and asthma.    Gastrointestinal: Negative for nausea and vomiting.   Musculoskeletal: Negative for muscle ache.   Skin: Negative for rash.   Allergic/Immunologic: Positive for environmental allergies and seasonal allergies. Negative for asthma.   Hematologic/Lymphatic: Negative for swollen lymph nodes.       Objective:      Physical Exam   Constitutional: She is oriented to person, place, and time. She appears well-developed and well-nourished. She is cooperative.  Non-toxic appearance. She does not have a sickly appearance. She does not appear ill. No distress.   HENT: "   Head: Normocephalic and atraumatic.   Right Ear: Hearing, tympanic membrane, external ear and ear canal normal.   Left Ear: Hearing, tympanic membrane, external ear and ear canal normal.   Nose: Nose normal. No mucosal edema, rhinorrhea or nasal deformity. No epistaxis. Right sinus exhibits no maxillary sinus tenderness and no frontal sinus tenderness. Left sinus exhibits no maxillary sinus tenderness and no frontal sinus tenderness.   Mouth/Throat: Uvula is midline, oropharynx is clear and moist and mucous membranes are normal. No trismus in the jaw. Normal dentition. No uvula swelling. No oropharyngeal exudate, posterior oropharyngeal edema or posterior oropharyngeal erythema.   Eyes: Conjunctivae and lids are normal. No scleral icterus.   Neck: Trachea normal, full passive range of motion without pain and phonation normal. Neck supple. No neck rigidity. No edema and no erythema present.   Cardiovascular: Normal rate, regular rhythm, normal heart sounds, intact distal pulses and normal pulses.   Pulmonary/Chest: Effort normal. No respiratory distress. She has no decreased breath sounds. She has wheezes. She has no rhonchi. She has rales.   Course BS anteriorly   Abdominal: Normal appearance.   Musculoskeletal: Normal range of motion. She exhibits no edema or deformity.   Neurological: She is alert and oriented to person, place, and time. She exhibits normal muscle tone. Coordination normal.   Skin: Skin is warm, dry, intact, not diaphoretic and not pale.   Psychiatric: She has a normal mood and affect. Her speech is normal and behavior is normal. Judgment and thought content normal. Cognition and memory are normal.   Nursing note and vitals reviewed.        Assessment:       1. Moderate persistent asthma with acute exacerbation        Plan:         Moderate persistent asthma with acute exacerbation  -     predniSONE (DELTASONE) 10 MG tablet; 3 daily for 3 days then 2 daily for 2 days then 1 daily for 2 days   Dispense: 15 tablet; Refill: 0  -     albuterol nebulizer solution 2.5 mg  -     ipratropium 0.02 % nebulizer solution 0.5 mg    Other orders  -     budesonide-formoterol 160-4.5 mcg (SYMBICORT) 160-4.5 mcg/actuation HFAA; Inhale 2 puffs into the lungs 2 (two) times daily. Rinse mouth out after use  Dispense: 1 Inhaler; Refill: 12  -     albuterol (PROVENTIL/VENTOLIN HFA) 90 mcg/actuation inhaler; Inhale 2 puffs into the lungs every 6 (six) hours as needed for Wheezing.  Dispense: 1 each; Refill: 11      Patient Instructions     Asthma (Adult)  Asthma is a disease where the medium and  small air passages within the lung go into spasm and restrict the flow of air. Inflammation and swelling of the airways cause further restriction. During an acute asthma attack, these factors cause difficulty breathing, wheezing, cough and chest tightness.    An asthma attack can be triggered by many things. Common triggers include infections such as the common cold, bronchitis, pneumonia. Irritants such as smoke or pollutants in the air, emotional upset, and exercise can also trigger an attack. In many adults with asthma, allergies to dust, mold, pollen and animal dander can cause an asthma attack. Skipping doses of daily asthma medicine can also bring on an asthma attack.  Asthma can be controlled using the proper medicines prescribed by your healthcare provider and avoiding exposure to known triggers including allergens and irritants.  Home care  · Take prescribed medicine exactly at the times advised. If you need medicine such as from a hand held inhaler or aerosol breathing machine more than every 4 hours, contact your healthcare provider or seek immediate medical attention. If prescribed an antibiotic or prednisone, take all of the medicine as prescribed, even if you are feeling better after a few days.  · Do not smoke. Avoid being exposed to the smoke of others.  · Some people with asthma have worsening of their symptoms when  "they take aspirin and non-steroidal or fever-reducing medicines like ibuprofen and naproxen. Talk to your healthcare provider if you think this may apply to you.  Follow-up care  Follow up with your healthcare provider, or as advised. Always bring all of your current medicines to any appointments with your healthcare provider. Also bring a complete list of medications even those not taken for asthma. If you do not already have one, talk to your healthcare provider about developing a personalized "Asthma Action Plan."  A pneumococcal (pneumonia) vaccine and yearly flu shot (every fall) are recommended. Ask your doctor about this.  When to seek medical advice  Call your healthcare provider right away if any of these occur:   · Increased wheezing or shortness of breath  · Need to use your inhalers more often than usual without relief  · Fever of 100.4ºF (38ºC) or higher, or as directed by your healthcare provider  · Coughing up lots of dark-colored or bloody sputum (mucus)  · Chest pain with each breath  · If you use a peak flow meter as part of an Asthma Action Plan, and you are still in the yellow zone (50% to 80%) 15 minutes after using inhaler medicine.  Call 911  Call 911 if any of the following occur  · Trouble walking or talking because of shortness of breath  · If you use a peak flow meter as part of an Asthma Action Plan and you are still in the red zone (less than 50%) 15 minutes after using inhaler medicine  · Lips or fingernails turning gray or blue  Date Last Reviewed: 12/2/2015  © 9676-7908 [employer]. [address] [place] [address], [place] [address]. All rights reserved. This information is not intended as a substitute for professional medical care. Always follow your healthcare professional's instructions.        Understanding Asthma    Asthma causes swelling and narrowing of the airways in your lungs. Medical experts are not exactly sure what causes asthma. It is believed to be caused by a mix " of inherited and environmental factors.  Healthy lungs  Inside the lungs there are branching airways made of stretchy tissue. Each airway is wrapped with bands of muscle. The airways become more narrow as they go deeper into the lungs. The smallest airways end in clusters of tiny balloon-like air sacs (alveoli). These clusters are surrounded by blood vessels. When you breathe in (inhale), air enters the lungs. It travels down through the airways until it reaches the air sacs. When you breathe out (exhale), air travels up through the airways and out of the lungs. The airways produce mucus that traps particles you breathe in. Normally, the mucus is then swept out of the lungs by tiny hairs (cilia) that line the airways. The mucus is swallowed or coughed up.  What the lungs do  The air you inhale contains oxygen. When oxygen reaches the air sacs, it passes into the blood vessels surrounding the sacs. Your blood then delivers oxygen to all of your cells. As you exhale, carbon dioxide is removed in a similar way from the blood in the air sacs, and from the body.  When you have asthma  People with asthma have very sensitive airways. This means the airways react to certain things called triggers (such as pollen, dust, or smoke) and become swollen and narrowed. Inflammation makes the airways swollen and narrowed. This is a long-lasting (chronic) problem. The airways may not always be narrowed enough to notice breathing problems.  Symptoms of chronic inflammation:   · Coughing  · Chest tightness  · Shortness of breath  · Wheezing (a whistling noise, especially when breathing out)  · Low energy or feeling tired  In some people, over time chronic mild inflammation can lead to lasting (permanent) scarring of airways and loss of lung function.  Moderate flare-ups  When sensitive airways are irritated by a trigger, the muscles around the airways tighten. The lining of the airways swells. Thick, sticky mucus increases and partly  clogs the airways. All of this makes you work harder to keep breathing.  Symptoms of moderate flare-ups:  · Coughing, especially at night  · Getting tired or out of breath easily  · Wheezing  · Chest tightness  · Faster breathing when at rest  Severe flare-ups  Severe flare-ups are life-threatening. In a severe flare-up, the muscle tightening, swelling, and mucus production are even worse. Its very hard to breathe. Your body can't get enough oxygen and can't remove carbon dioxide. Waste gas is trapped in the alveoli, and gas exchange cant occur. The body is not getting enough oxygen. Without oxygen, body tissues, especially brain tissue, begin to get damaged. If this goes on for long, it can lead to severe brain damage or death.  Call 911 (or have someone call for you) if you have any of these symptoms and they are not relieved right away by taking your quick-relief medicine as prescribed:  · Severe trouble breathing  · Too short of breath to talk or walk  · Lips or fingers turning blue  · Feeling lightheaded or dizzy, as though you are about to pass out  · Peak flow less than 50% of your personal best, if you use peak flow monitoring  Asthma is a long-term condition. So its important to work with your healthcare provider to manage it. If you smoke, get help to quit. Know your triggers and figure out how to avoid them. Its also very important to take your medicines as directed. That means taking them even when you feel good.  Date Last Reviewed: 12/1/2016  © 1604-0493 [employer]. [address] [place] [address], [place] [address]. All rights reserved. This information is not intended as a substitute for professional medical care. Always follow your healthcare professional's instructions.        Inhaler Use  The inhaler that you were prescribed contains a potent medicine. It should only be used as directed. The medicine in your inhaler must be breathed deeply into your lungs for it to work. It will not work  at all if it only reaches your mouth and throat. Follow the instructions below for best results. And remember to follow your asthma action plan as given to you by your doctor.  1. Keep your inhaler at room temperature.  2. Hold the inhaler so that the part that goes into your mouth is at the bottom.  3. Shake the inhaler well and remove the cap.  4. Breathe out through your mouth to fully empty your lungs.  5. Place the inhaler in your mouth and close your lips tightly around it. (Or hold the inhaler 1 to 2 inches from your open mouth if told to do so by your healthcare provider.)  6. Squeeze the inhaler as you breathe in slowly through your mouth until your lungs are full of air, drawing the medicine deep into your lungs.  7. Hold your breath for 10 seconds, or as long as you can comfortably hold your breath. Then breathe out slowly.  8. If you have been advised to take 2 puffs, wait 5 minutes, then repeat steps 3-7 above. Waiting 5 minutes between puffs will alow the medicine to open up your lungs so the second puff can get deeper into the lungs. Replace the cap when done.  9. If you were prescribed both a steroid inhaler and a bronchodilator inhaler, use the bronchodilator first to open the air passages. Wait 5 minutes, then use the steroid inhaler.  10. Rinse your mouth with water and spit it out (especially after using a steroid inhaler). This is very important if you are using a steroid inhaler to prevent thrush, a mild yeast infection of the mouth and back of the throat.  11. A special chamber (spacer) may be prescribed that attaches to your inhaler. This increases the amount of medicine that goes to your lungs. It also improves how well each treatment works. Ask your doctor about this if you did not receive one.    Keep it clean  Remove the metal canister and do not immerse it in water. Then clean the plastic mouthpiece, cap, and spacer if you have one, by rinsing them well in warm running water for 30 to  60 seconds. Shake off excess water and allow the mouthpiece to dry completely (overnight is recommended). This should be done once a week. If you need the inhaler before the mouthpiece is dry, shake off excess water, replace canister, and test spray 2 times (away from the face).  Warning  A steroid inhaler is used to prevent an asthma attack. Do not use this to treat an acute wheezing episode. Use only bronchodilator inhalers (quick relief) to treat an acute asthma attack.  If you find that your medicine is not working and you need to use it more often than prescribed, this could be a sign that your asthma is getting worse. Go to the emergency room or urgent care right away. An asthma attack is easiest to treat in the early stages before it becomes severe.  When to seek medical advice  Get prompt medical attention if any of the following occur:  · Increased wheezing or shortness of breath  · Need to use your inhalers more often than usual without relief  · Fever of 100.4°F (38°C) or higher, or as directed by your healthcare provider  · Coughing up lots of dark-colored or bloody sputum (mucus)  · Chest pain with each breath  · Blue lips or fingernails  · Peak flow reading less than 50% of your normal best  Date Last Reviewed: 12/2/2015  © 3648-0815 [employer]. [address] [place] [address], [place] [address]. All rights reserved. This information is not intended as a substitute for professional medical care. Always follow your healthcare professional's instructions.        Controlling Other Triggers  Many things can trigger symptoms in people with respiratory conditions like asthma or COPD. They may be allergens such as mold, pollens, or dust. Or they may be irritants such as smoke or strong odors. You may find there are things that trigger symptoms that arent allergens or irritants. These include weather changes, illness, stress, and exercise. The tips below can help to ease your symptoms.  Weather  Certain  weather conditions can trigger symptoms. Or they can make other triggers worse.  · Keep track of weather conditions that affect you. Very high or low temperatures, or high humidity, can make symptoms worse. For some people major changes in weather can be a trigger.  · Limit outdoor activity during the type of weather that affects you.  · Wear a scarf over your mouth and nose in cold weather.  Colds, flu, and sinus infections    Illnesses that affect the nose, throat, and sinuses can irritate your lungs. These illnesses are called upper respiratory infections. They can cause asthma flare-ups.  · Wash your hands often with soap and warm water. Or use an alcohol-based hand .  · Get a yearly flu shot. And talk with your healthcare provider about whether you should get the pneumonia vaccine.  · Take care of your general health. Get plenty of sleep. And eat a healthy, balanced diet with lots of fruit and vegetables.  Food additives  Food additives can trigger asthma flare-ups in some people.  · Check food labels for sulfites, metabisulfites, and sulfur dioxide. These are often found in foods such as wine, beer, and dried fruits.  · Do not eat foods that contain these additives if they trigger your asthma.  Medicines  Some medicines may cause symptoms in some people with asthma. These include aspirin, nonsteroidal anti-inflammatory drugs (NSAIDs) such as ibuprofen and naproxen, and some beta-blockers.  · Tell your healthcare provider if you think certain medicines trigger symptoms.  · Make sure to read the labels on over-the-counter medicines. They may have ingredients that cause symptoms for you.  Emotions  Laughing, crying, or feeling excited are triggers for some people.  · Try this breathing exercise to stay calm: Start by breathing in slowly through your nose for a count of 2 seconds. Then close your lips and breathe out for a count of 4 seconds.  · Try to focus on a soothing image in your mind. This will help  relax you and calm your breathing.  · Remember to take your daily controller medicines. When you are upset or under stress, its easy to forget.  Exercise  For some people, exercise can trigger symptoms. Dont let this stop you from being active. As you know, exercise is good for both your overall health and your lung health. It also strengthens the heart and blood vessels. It also may lower your sensitivity to triggers. These tips and your healthcare provider's advice can help:  · If you have not been exercising regularly, start slow and do more gradually.  · Take all of your medicines as prescribed.  · If you use quick-relief medicine, make sure you have it with you when you exercise.   · Ask your healthcare provider if you might benefit from taking your quick-relief medicine before you exercise.  · Stop if you have any symptoms. Make sure you talk with your healthcare provider about these symptoms.  Date Last Reviewed: 12/1/2016  © 7722-5779 [employer]. [address] [place] [address], [place] [address]. All rights reserved. This information is not intended as a substitute for professional medical care. Always follow your healthcare professional's instructions.

## 2023-07-27 ENCOUNTER — OFFICE VISIT (OUTPATIENT)
Dept: INTERNAL MEDICINE | Facility: CLINIC | Age: 48
End: 2023-07-27
Payer: COMMERCIAL

## 2023-07-27 VITALS
BODY MASS INDEX: 37.3 KG/M2 | DIASTOLIC BLOOD PRESSURE: 80 MMHG | HEIGHT: 67 IN | SYSTOLIC BLOOD PRESSURE: 130 MMHG | OXYGEN SATURATION: 99 % | WEIGHT: 237.63 LBS | HEART RATE: 96 BPM

## 2023-07-27 DIAGNOSIS — Z00.00 ANNUAL PHYSICAL EXAM: Primary | ICD-10-CM

## 2023-07-27 DIAGNOSIS — G47.00 INSOMNIA, UNSPECIFIED TYPE: ICD-10-CM

## 2023-07-27 DIAGNOSIS — Z12.11 COLON CANCER SCREENING: ICD-10-CM

## 2023-07-27 PROCEDURE — 1159F PR MEDICATION LIST DOCUMENTED IN MEDICAL RECORD: ICD-10-PCS | Mod: CPTII,S$GLB,, | Performed by: PHYSICIAN ASSISTANT

## 2023-07-27 PROCEDURE — 3079F PR MOST RECENT DIASTOLIC BLOOD PRESSURE 80-89 MM HG: ICD-10-PCS | Mod: CPTII,S$GLB,, | Performed by: PHYSICIAN ASSISTANT

## 2023-07-27 PROCEDURE — 99396 PREV VISIT EST AGE 40-64: CPT | Mod: S$GLB,,, | Performed by: PHYSICIAN ASSISTANT

## 2023-07-27 PROCEDURE — 99396 PR PREVENTIVE VISIT,EST,40-64: ICD-10-PCS | Mod: S$GLB,,, | Performed by: PHYSICIAN ASSISTANT

## 2023-07-27 PROCEDURE — 3079F DIAST BP 80-89 MM HG: CPT | Mod: CPTII,S$GLB,, | Performed by: PHYSICIAN ASSISTANT

## 2023-07-27 PROCEDURE — 1160F PR REVIEW ALL MEDS BY PRESCRIBER/CLIN PHARMACIST DOCUMENTED: ICD-10-PCS | Mod: CPTII,S$GLB,, | Performed by: PHYSICIAN ASSISTANT

## 2023-07-27 PROCEDURE — 99999 PR PBB SHADOW E&M-NEW PATIENT-LVL V: CPT | Mod: PBBFAC,,, | Performed by: PHYSICIAN ASSISTANT

## 2023-07-27 PROCEDURE — 3075F PR MOST RECENT SYSTOLIC BLOOD PRESS GE 130-139MM HG: ICD-10-PCS | Mod: CPTII,S$GLB,, | Performed by: PHYSICIAN ASSISTANT

## 2023-07-27 PROCEDURE — 1159F MED LIST DOCD IN RCRD: CPT | Mod: CPTII,S$GLB,, | Performed by: PHYSICIAN ASSISTANT

## 2023-07-27 PROCEDURE — 3008F PR BODY MASS INDEX (BMI) DOCUMENTED: ICD-10-PCS | Mod: CPTII,S$GLB,, | Performed by: PHYSICIAN ASSISTANT

## 2023-07-27 PROCEDURE — 3044F HG A1C LEVEL LT 7.0%: CPT | Mod: CPTII,S$GLB,, | Performed by: PHYSICIAN ASSISTANT

## 2023-07-27 PROCEDURE — 1160F RVW MEDS BY RX/DR IN RCRD: CPT | Mod: CPTII,S$GLB,, | Performed by: PHYSICIAN ASSISTANT

## 2023-07-27 PROCEDURE — 3044F PR MOST RECENT HEMOGLOBIN A1C LEVEL <7.0%: ICD-10-PCS | Mod: CPTII,S$GLB,, | Performed by: PHYSICIAN ASSISTANT

## 2023-07-27 PROCEDURE — 3008F BODY MASS INDEX DOCD: CPT | Mod: CPTII,S$GLB,, | Performed by: PHYSICIAN ASSISTANT

## 2023-07-27 PROCEDURE — 3075F SYST BP GE 130 - 139MM HG: CPT | Mod: CPTII,S$GLB,, | Performed by: PHYSICIAN ASSISTANT

## 2023-07-27 PROCEDURE — 99999 PR PBB SHADOW E&M-NEW PATIENT-LVL V: ICD-10-PCS | Mod: PBBFAC,,, | Performed by: PHYSICIAN ASSISTANT

## 2023-07-27 NOTE — PROGRESS NOTES
Subjective:       Patient ID: Adriana Cardozo is a 47 y.o. female.    Chief Complaint: Establish Care      Established pt of Agueda Steve MD (new to me)    HPI        Here for annual exam, establish care at [facility].       Was seeing GYN (romero) as PCP    C/o insomnia, not sleeping well. Mood ebbs and flows. Prev on meds(zoloft, trazodone, xanax, prazosin), no longer taking at this time. Using [facility].       Past Medical History:   Diagnosis Date    Anxiety     Depression     PTSD (post-traumatic stress disorder)        Social History     Tobacco Use    Smoking status: Never    Smokeless tobacco: Never   Substance Use Topics    Alcohol use: Yes     Comment: soc    Drug use: No       Review of patient's allergies indicates:  No Known Allergies      Current Outpatient Medications:     albuterol (PROVENTIL/VENTOLIN HFA) 90 mcg/actuation inhaler, Inhale 2 puffs into the lungs every 6 (six) hours as needed for Wheezing., Disp: 1 each, Rfl: 11    tirzepatide 7.5 mg/0.5 mL PnIj, Inject 7.5 mg into the skin every 7 days., Disp: , Rfl:     alprazolam (XANAX) 1 MG tablet, Take by mouth 2 (two) times daily., Disp: , Rfl:     budesonide-formoterol 160-4.5 mcg (SYMBICORT) 160-4.5 mcg/actuation HFAA, Inhale 2 puffs into the lungs 2 (two) times daily. Rinse mouth out after use, Disp: 1 Inhaler, Rfl: 12    PRAZOSIN HCL (PRAZOSIN ORAL), Take by mouth., Disp: , Rfl:     sertraline (ZOLOFT) 100 MG tablet, Take 100 mg by mouth once daily., Disp: , Rfl:     trazodone (DESYREL) 100 MG tablet, Take 100 mg by mouth every evening., Disp: , Rfl:     Family History   Problem Relation Age of Onset    Cancer Mother        History reviewed. No pertinent surgical history.    Review of Systems   Constitutional:  Negative for chills, fever and unexpected weight change.   Eyes:  Negative for visual disturbance.   Respiratory:  Negative for cough, shortness of breath and wheezing.    Cardiovascular:  Negative for chest pain and leg  "swelling.   Gastrointestinal:  Negative for abdominal pain, nausea and vomiting.   Integumentary:  Negative for rash.   Neurological:  Negative for weakness, light-headedness and headaches.   Psychiatric/Behavioral:  Positive for sleep disturbance.        Objective: /80 (BP Location: Left arm, Patient Position: Sitting, BP Method: Large (Manual))   Pulse 96   Ht 5' 7" (1.702 m)   Wt 107.8 kg (237 lb 10.5 oz)   SpO2 99%   BMI 37.22 kg/m²         Physical Exam  Vitals reviewed.   Constitutional:       General: She is not in acute distress.     Appearance: She is well-developed.   HENT:      Head: Normocephalic and atraumatic.      Right Ear: Tympanic membrane, ear canal and external ear normal.      Left Ear: Tympanic membrane, ear canal and external ear normal.   Cardiovascular:      Rate and Rhythm: Normal rate and regular rhythm.      Heart sounds: No murmur heard.  Pulmonary:      Effort: Pulmonary effort is normal.      Breath sounds: Normal breath sounds. No wheezing or rales.   Abdominal:      General: Bowel sounds are normal.      Palpations: Abdomen is soft.      Tenderness: There is no abdominal tenderness.   Musculoskeletal:      Right lower leg: No edema.      Left lower leg: No edema.   Skin:     General: Skin is warm and dry.      Findings: No rash.   Neurological:      Mental Status: She is alert.   Psychiatric:         Mood and Affect: Mood normal.         Assessment:       1. Annual physical exam    2. Colon cancer screening    3. Insomnia, unspecified type        Plan:             Adriana was seen today for establish care.    Diagnoses and all orders for this visit:    Annual physical exam   reviewed and updated  -     CBC Auto Differential; Future  -     Comprehensive Metabolic Panel; Future  -     TSH; Future  -     Lipid Panel; Future  -     Hemoglobin A1C; Future  -     Vitamin D; Future  -     Hepatitis C Antibody; Future    Insomnia, unspecified type  -     Ambulatory " referral/consult to Sleep Disorders; Future    Colon cancer screening  -     Ambulatory referral/consult to Endo Procedure ; Future    Discussed need to choose MD as PCP to fully establish care with our practice site ([facility] with Dr. Pantoja)    Jenny Vivar PA-C

## 2023-07-28 ENCOUNTER — LAB VISIT (OUTPATIENT)
Dept: LAB | Facility: HOSPITAL | Age: 48
End: 2023-07-28
Payer: COMMERCIAL

## 2023-07-28 DIAGNOSIS — Z00.00 ANNUAL PHYSICAL EXAM: ICD-10-CM

## 2023-07-28 LAB
25(OH)D3+25(OH)D2 SERPL-MCNC: 11 NG/ML (ref 30–96)
ALBUMIN SERPL BCP-MCNC: 4.1 G/DL (ref 3.5–5.2)
ALP SERPL-CCNC: 87 U/L (ref 55–135)
ALT SERPL W/O P-5'-P-CCNC: 29 U/L (ref 10–44)
ANION GAP SERPL CALC-SCNC: 14 MMOL/L (ref 8–16)
AST SERPL-CCNC: 21 U/L (ref 10–40)
BASOPHILS # BLD AUTO: 0.01 K/UL (ref 0–0.2)
BASOPHILS NFR BLD: 0.2 % (ref 0–1.9)
BILIRUB SERPL-MCNC: 0.9 MG/DL (ref 0.1–1)
BUN SERPL-MCNC: 6 MG/DL (ref 6–20)
CALCIUM SERPL-MCNC: 9.2 MG/DL (ref 8.7–10.5)
CHLORIDE SERPL-SCNC: 104 MMOL/L (ref 95–110)
CHOLEST SERPL-MCNC: 226 MG/DL (ref 120–199)
CHOLEST/HDLC SERPL: 4.5 {RATIO} (ref 2–5)
CO2 SERPL-SCNC: 22 MMOL/L (ref 23–29)
CREAT SERPL-MCNC: 0.9 MG/DL (ref 0.5–1.4)
DIFFERENTIAL METHOD: NORMAL
EOSINOPHIL # BLD AUTO: 0.3 K/UL (ref 0–0.5)
EOSINOPHIL NFR BLD: 4.9 % (ref 0–8)
ERYTHROCYTE [DISTWIDTH] IN BLOOD BY AUTOMATED COUNT: 13.3 % (ref 11.5–14.5)
EST. GFR  (NO RACE VARIABLE): >60 ML/MIN/1.73 M^2
ESTIMATED AVG GLUCOSE: 103 MG/DL (ref 68–131)
GLUCOSE SERPL-MCNC: 78 MG/DL (ref 70–110)
HBA1C MFR BLD: 5.2 % (ref 4–5.6)
HCT VFR BLD AUTO: 37.3 % (ref 37–48.5)
HCV AB SERPL QL IA: NORMAL
HDLC SERPL-MCNC: 50 MG/DL (ref 40–75)
HDLC SERPL: 22.1 % (ref 20–50)
HGB BLD-MCNC: 12.6 G/DL (ref 12–16)
IMM GRANULOCYTES # BLD AUTO: 0.01 K/UL (ref 0–0.04)
IMM GRANULOCYTES NFR BLD AUTO: 0.2 % (ref 0–0.5)
LDLC SERPL CALC-MCNC: 159.4 MG/DL (ref 63–159)
LYMPHOCYTES # BLD AUTO: 1.4 K/UL (ref 1–4.8)
LYMPHOCYTES NFR BLD: 25.4 % (ref 18–48)
MCH RBC QN AUTO: 29 PG (ref 27–31)
MCHC RBC AUTO-ENTMCNC: 33.8 G/DL (ref 32–36)
MCV RBC AUTO: 86 FL (ref 82–98)
MONOCYTES # BLD AUTO: 0.3 K/UL (ref 0.3–1)
MONOCYTES NFR BLD: 5.5 % (ref 4–15)
NEUTROPHILS # BLD AUTO: 3.6 K/UL (ref 1.8–7.7)
NEUTROPHILS NFR BLD: 63.8 % (ref 38–73)
NONHDLC SERPL-MCNC: 176 MG/DL
NRBC BLD-RTO: 0 /100 WBC
PLATELET # BLD AUTO: 219 K/UL (ref 150–450)
PMV BLD AUTO: 10.3 FL (ref 9.2–12.9)
POTASSIUM SERPL-SCNC: 3.4 MMOL/L (ref 3.5–5.1)
PROT SERPL-MCNC: 7 G/DL (ref 6–8.4)
RBC # BLD AUTO: 4.34 M/UL (ref 4–5.4)
SODIUM SERPL-SCNC: 140 MMOL/L (ref 136–145)
T4 FREE SERPL-MCNC: 1.07 NG/DL (ref 0.71–1.51)
TRIGL SERPL-MCNC: 83 MG/DL (ref 30–150)
TSH SERPL DL<=0.005 MIU/L-ACNC: 4.31 UIU/ML (ref 0.4–4)
WBC # BLD AUTO: 5.68 K/UL (ref 3.9–12.7)

## 2023-07-28 PROCEDURE — 85025 COMPLETE CBC W/AUTO DIFF WBC: CPT | Performed by: PHYSICIAN ASSISTANT

## 2023-07-28 PROCEDURE — 83036 HEMOGLOBIN GLYCOSYLATED A1C: CPT | Performed by: PHYSICIAN ASSISTANT

## 2023-07-28 PROCEDURE — 36415 COLL VENOUS BLD VENIPUNCTURE: CPT | Performed by: PHYSICIAN ASSISTANT

## 2023-07-28 PROCEDURE — 80053 COMPREHEN METABOLIC PANEL: CPT | Performed by: PHYSICIAN ASSISTANT

## 2023-07-28 PROCEDURE — 86803 HEPATITIS C AB TEST: CPT | Performed by: PHYSICIAN ASSISTANT

## 2023-07-28 PROCEDURE — 84443 ASSAY THYROID STIM HORMONE: CPT | Performed by: PHYSICIAN ASSISTANT

## 2023-07-28 PROCEDURE — 82306 VITAMIN D 25 HYDROXY: CPT | Performed by: PHYSICIAN ASSISTANT

## 2023-07-28 PROCEDURE — 80061 LIPID PANEL: CPT | Performed by: PHYSICIAN ASSISTANT

## 2023-07-28 PROCEDURE — 84439 ASSAY OF FREE THYROXINE: CPT | Performed by: PHYSICIAN ASSISTANT

## 2023-08-04 ENCOUNTER — PATIENT MESSAGE (OUTPATIENT)
Dept: ENDOSCOPY | Facility: HOSPITAL | Age: 48
End: 2023-08-04
Payer: COMMERCIAL

## 2023-08-04 DIAGNOSIS — Z12.11 COLON CANCER SCREENING: Primary | ICD-10-CM

## 2023-09-13 ENCOUNTER — PATIENT MESSAGE (OUTPATIENT)
Dept: ENDOSCOPY | Facility: HOSPITAL | Age: 48
End: 2023-09-13
Payer: COMMERCIAL

## 2023-09-15 ENCOUNTER — ANESTHESIA EVENT (OUTPATIENT)
Dept: ENDOSCOPY | Facility: HOSPITAL | Age: 48
End: 2023-09-15
Payer: COMMERCIAL

## 2023-09-15 NOTE — ANESTHESIA PREPROCEDURE EVALUATION
09/15/2023  Adriana Cardozo is a 47 y.o., female.  [facility]  Anesthesia Pre-Operative Evaluation       Patient Name: Adriana Cardozo  YOB: 1975  MRN: [id number]  [facility]: [id number]      Code Status: No Order   Date of Procedure: 9/18/2023  Anesthesia: Choice Procedure: Procedure(s) (LRB):  COLONOSCOPY (N/A)  Pre-Operative Diagnosis: Colon cancer screening [Z12.11]  Proceduralist: Surgeon(s) and Role:     * Carlitos Jimenez MD - Primary Nurse: (Unknown)      SUBJECTIVE:   Adriana Cardozo is a 47 y.o. female who  has a past medical history of Anxiety, Depression, and PTSD (post-traumatic stress disorder)..     she has a current medication list which includes the following long-term medication(s): albuterol, alprazolam, budesonide-formoterol 160-4.5 mcg, sertraline, and trazodone.     ALLERGIES:   Review of patient's allergies indicates:  No Known Allergies  LDA:          Lines/Drains/Airways     None                Anesthesia Evaluation     Patient summary reviewed    Airway   Mallampati: II  TM distance: Normal  Dental      Pulmonary - negative ROS   Cardiovascular   Exercise tolerance: good    Neuro/Psych      GI/Hepatic/Renal - negative ROS   (+) bowel prep    Endo/Other    Abdominal                   MEDICATIONS:     Antibiotics (From admission, onward)    None        VTE Risk Mitigation (From admission, onward)    None            No current facility-administered medications for this encounter.     Current Outpatient Medications   Medication Sig Dispense Refill    albuterol (PROVENTIL/VENTOLIN HFA) 90 mcg/actuation inhaler Inhale 2 puffs into the lungs every 6 (six) hours as needed for Wheezing. 1 each 11    alprazolam (XANAX) 1 MG tablet Take by mouth 2 (two) times daily.      budesonide-formoterol 160-4.5 mcg (SYMBICORT) 160-4.5 mcg/actuation HFAA Inhale 2 puffs  "into the lungs 2 (two) times daily. Rinse mouth out after use 1 Inhaler 12    PRAZOSIN HCL (PRAZOSIN ORAL) Take by mouth.      sertraline (ZOLOFT) 100 MG tablet Take 100 mg by mouth once daily.      tirzepatide 7.5 mg/0.5 mL PnIj Inject 7.5 mg into the skin every 7 days.      trazodone (DESYREL) 100 MG tablet Take 100 mg by mouth every evening.            History:   There are no hospital problems to display for this patient.    Surgical History:    has no past surgical history on file.   Social History:    reports being sexually active.  reports that she has never smoked. She has never used smokeless tobacco. She reports current alcohol use. She reports that she does not use drugs.     OBJECTIVE:     Vital Signs (Most Recent):    Vital Signs Range (Last 24H):  BP: ()/()   Arterial Line BP: ()/()        There is no height or weight on file to calculate BMI.   Wt Readings from Last 4 Encounters:   07/27/23 107.8 kg (237 lb 10.5 oz)   11/18/19 91.2 kg (201 lb)   03/04/19 91.2 kg (201 lb)   02/26/19 91.2 kg (201 lb)       Significant Labs:  Lab Results   Component Value Date    WBC 5.68 07/28/2023    HGB 12.6 07/28/2023    HCT 37.3 07/28/2023     07/28/2023     07/28/2023    K 3.4 (L) 07/28/2023     07/28/2023    CREATININE 0.9 07/28/2023    BUN 6 07/28/2023    CO2 22 (L) 07/28/2023    GLU 78 07/28/2023    CALCIUM 9.2 07/28/2023    ALKPHOS 87 07/28/2023    ALT 29 07/28/2023    AST 21 07/28/2023    ALBUMIN 4.1 07/28/2023    HGBA1C 5.2 07/28/2023     No LMP recorded.  No results found for this or any previous visit (from the past 72 hour(s)).    EKG:   No results found for this or any previous visit.    TTE:  No results found for this or any previous visit.  No results found for: "EF"   No results found for this or any previous visit.  GAYLE:  No results found for this or any previous visit.  Stress Test:  No results found for this or any previous visit.     LHC:  No results found for this or any " "previous visit.     PFT:  No results found for: "FEV1", "FVC", "SPZ7GAX", "TLC", "DLCO"     ASSESSMENT/PLAN:       Pre-op Assessment    I have reviewed the Patient Summary Reports.     I have reviewed the Nursing Notes. I have reviewed the NPO Status.   I have reviewed the Medications.     Review of Systems  Anesthesia Hx:  No problems with previous Anesthesia  Denies Family Hx of Anesthesia complications.   Denies Personal Hx of Anesthesia complications.   Hematology/Oncology:  Hematology Normal   Oncology Normal     EENT/Dental:EENT/Dental Normal   Cardiovascular:  Cardiovascular Normal Exercise tolerance: good     Pulmonary:  Pulmonary Normal    Renal/:  Renal/ Normal     Hepatic/GI:  Hepatic/GI Normal Bowel Prep.    Musculoskeletal:  Musculoskeletal Normal    Neurological:  Neurology Normal    Endocrine:  Endocrine Normal    Dermatological:  Skin Normal    Psych:   anxiety depression          Physical Exam  General: Well nourished, Alert, Cooperative and Oriented    Airway:  Mallampati: II   Mouth Opening: Normal  TM Distance: Normal  Tongue: Normal        Anesthesia Plan  Type of Anesthesia, risks & benefits discussed:    Anesthesia Type: Gen Natural Airway  Intra-op Monitoring Plan: Standard ASA Monitors  Post Op Pain Control Plan: multimodal analgesia  Induction:  IV  Informed Consent: Informed consent signed with the Patient and all parties understand the risks and agree with anesthesia plan.  All questions answered. Patient consented to blood products? No  ASA Score: 2  Day of Surgery Review of History & Physical: H&P Update referred to the surgeon/provider.I have interviewed and examined the patient. I have reviewed the patient's H&P dated: H&P completed by Anesthesiologist.    Ready For Surgery From Anesthesia Perspective.     .      "

## 2023-09-18 ENCOUNTER — ANESTHESIA (OUTPATIENT)
Dept: ENDOSCOPY | Facility: HOSPITAL | Age: 48
End: 2023-09-18
Payer: COMMERCIAL

## 2023-09-18 ENCOUNTER — HOSPITAL ENCOUNTER (OUTPATIENT)
Facility: HOSPITAL | Age: 48
Discharge: HOME | End: 2023-09-18
Attending: INTERNAL MEDICINE | Admitting: INTERNAL MEDICINE
Payer: COMMERCIAL

## 2023-09-18 VITALS
HEART RATE: 77 BPM | RESPIRATION RATE: 20 BRPM | HEIGHT: 67 IN | BODY MASS INDEX: 36.1 KG/M2 | TEMPERATURE: 98 F | SYSTOLIC BLOOD PRESSURE: 131 MMHG | DIASTOLIC BLOOD PRESSURE: 80 MMHG | WEIGHT: 230 LBS | OXYGEN SATURATION: 99 %

## 2023-09-18 DIAGNOSIS — Z12.11 COLON CANCER SCREENING: Primary | ICD-10-CM

## 2023-09-18 LAB
B-HCG UR QL: NEGATIVE
CTP QC/QA: YES

## 2023-09-18 PROCEDURE — 88305 TISSUE EXAM BY PATHOLOGIST: CPT | Performed by: PATHOLOGY

## 2023-09-18 PROCEDURE — D9220A PRA ANESTHESIA: ICD-10-PCS | Mod: 33,,, | Performed by: NURSE ANESTHETIST, CERTIFIED REGISTERED

## 2023-09-18 PROCEDURE — 88305 TISSUE EXAM BY PATHOLOGIST: ICD-10-PCS | Mod: 26,,, | Performed by: PATHOLOGY

## 2023-09-18 PROCEDURE — 45385 PR COLONOSCOPY,REMV LESN,SNARE: ICD-10-PCS | Mod: 33,,, | Performed by: INTERNAL MEDICINE

## 2023-09-18 PROCEDURE — 94761 N-INVAS EAR/PLS OXIMETRY MLT: CPT

## 2023-09-18 PROCEDURE — 81025 URINE PREGNANCY TEST: CPT | Performed by: INTERNAL MEDICINE

## 2023-09-18 PROCEDURE — 45385 COLONOSCOPY W/LESION REMOVAL: CPT | Mod: PT | Performed by: INTERNAL MEDICINE

## 2023-09-18 PROCEDURE — 37000008 HC ANESTHESIA 1ST 15 MINUTES: Performed by: INTERNAL MEDICINE

## 2023-09-18 PROCEDURE — D9220A PRA ANESTHESIA: Mod: 33,,, | Performed by: NURSE ANESTHETIST, CERTIFIED REGISTERED

## 2023-09-18 PROCEDURE — 63600175 PHARM REV CODE 636 W HCPCS: Performed by: ANESTHESIOLOGY

## 2023-09-18 PROCEDURE — 25000003 PHARM REV CODE 250: Performed by: ANESTHESIOLOGY

## 2023-09-18 PROCEDURE — 37000009 HC ANESTHESIA EA ADD 15 MINS: Performed by: INTERNAL MEDICINE

## 2023-09-18 PROCEDURE — 25000003 PHARM REV CODE 250: Performed by: INTERNAL MEDICINE

## 2023-09-18 PROCEDURE — 27201089 HC SNARE, DISP (ANY): Performed by: INTERNAL MEDICINE

## 2023-09-18 PROCEDURE — 88305 TISSUE EXAM BY PATHOLOGIST: CPT | Mod: 26,,, | Performed by: PATHOLOGY

## 2023-09-18 PROCEDURE — 99900035 HC TECH TIME PER 15 MIN (STAT)

## 2023-09-18 PROCEDURE — 45385 COLONOSCOPY W/LESION REMOVAL: CPT | Mod: 33,,, | Performed by: INTERNAL MEDICINE

## 2023-09-18 RX ORDER — LIDOCAINE HYDROCHLORIDE 20 MG/ML
INJECTION, SOLUTION EPIDURAL; INFILTRATION; INTRACAUDAL; PERINEURAL
Status: DISCONTINUED | OUTPATIENT
Start: 2023-09-18 | End: 2023-09-18

## 2023-09-18 RX ORDER — PROPOFOL 10 MG/ML
VIAL (ML) INTRAVENOUS
Status: DISCONTINUED | OUTPATIENT
Start: 2023-09-18 | End: 2023-09-18

## 2023-09-18 RX ORDER — SODIUM CHLORIDE 9 MG/ML
INJECTION, SOLUTION INTRAVENOUS CONTINUOUS
Status: DISCONTINUED | OUTPATIENT
Start: 2023-09-18 | End: 2023-09-18 | Stop reason: HOSPADM

## 2023-09-18 RX ADMIN — PROPOFOL 80 MG: 10 INJECTION, EMULSION INTRAVENOUS at 11:09

## 2023-09-18 RX ADMIN — SODIUM CHLORIDE: 0.9 INJECTION, SOLUTION INTRAVENOUS at 11:09

## 2023-09-18 RX ADMIN — LIDOCAINE HYDROCHLORIDE 100 MG: 20 INJECTION, SOLUTION EPIDURAL; INFILTRATION; INTRACAUDAL; PERINEURAL at 11:09

## 2023-09-18 RX ADMIN — GLYCOPYRROLATE 0.2 MG: 0.2 INJECTION, SOLUTION INTRAMUSCULAR; INTRAVENOUS at 11:09

## 2023-09-18 NOTE — TRANSFER OF CARE
"Anesthesia Transfer of Care Note    Patient: Adriana Cardozo    Procedure(s) Performed: Procedure(s) (LRB):  COLONOSCOPY (N/A)    Patient location: GI    Anesthesia Type: general    Transport from OR: Transported from OR on room air with adequate spontaneous ventilation    Post pain: adequate analgesia    Post assessment: no apparent anesthetic complications    Post vital signs: stable    Level of consciousness: awake, alert and oriented    Nausea/Vomiting: no nausea/vomiting    Complications: none    Transfer of care protocol was followed      Last vitals:   Visit Vitals  BP (!) 129/91 (BP Location: Left arm, Patient Position: Lying)   Pulse 78   Temp 36.5 °C (97.7 °F) (Temporal)   Resp 16   Ht 5' 7" (1.702 m)   Wt 104.3 kg (230 lb)   SpO2 98%   Breastfeeding No   BMI 36.02 kg/m²     "

## 2023-09-18 NOTE — PROVATION PATIENT INSTRUCTIONS
Discharge Summary/Instructions after an Endoscopic Procedure  Patient Name: Adriana Cardozo  Patient MRN: [id number]  Patient YOB: 1975 Monday, September 18, 2023  Carlitos Jimenez MD  Dear patient,  As a result of recent federal legislation (The Federal Cures Act), you may   receive lab or pathology results from your procedure in your [facility]   account before your physician is able to contact you. Your physician or   their representative will relay the results to you with their   recommendations at their soonest availability.  Thank you,  RESTRICTIONS:  During your procedure today, you received medications for sedation.  These   medications may affect your judgment, balance and coordination.  Therefore,   for 24 hours, you have the following restrictions:   - DO NOT drive a car, operate machinery, make legal/financial decisions,   sign important papers or drink alcohol.    ACTIVITY:  Today: no heavy lifting, straining or running due to procedural   sedation/anesthesia.  The following day: return to full activity including work.  DIET:  Eat and drink normally unless instructed otherwise.     TREATMENT FOR COMMON SIDE EFFECTS:  - Mild abdominal pain, nausea, belching, bloating or excessive gas:  rest,   eat lightly and use a heating pad.  - Sore Throat: treat with throat lozenges and/or gargle with warm salt   water.  - Because air was used during the procedure, expelling large amounts of air   from your rectum or belching is normal.  - If a bowel prep was taken, you may not have a bowel movement for 1-3 days.    This is normal.  SYMPTOMS TO WATCH FOR AND REPORT TO YOUR PHYSICIAN:  1. Abdominal pain or bloating, other than gas cramps.  2. Chest pain.  3. Back pain.  4. Signs of infection such as: chills or fever occurring within 24 hours   after the procedure.  5. Rectal bleeding, which would show as bright red, maroon, or black stools.   (A tablespoon of blood from the rectum is not serious, especially  if   hemorrhoids are present.)  6. Vomiting.  7. Weakness or dizziness.  GO DIRECTLY TO THE NEAREST EMERGENCY ROOM IF YOU HAVE ANY OF THE FOLLOWING:      Difficulty breathing              Chills and/or fever over 101 F   Persistent vomiting and/or vomiting blood   Severe abdominal pain   Severe chest pain   Black, tarry stools   Bleeding- more than one tablespoon   Any other symptom or condition that you feel may need urgent attention  Your doctor recommends these additional instructions:  If any biopsies were taken, your doctors clinic will contact you in 1 to 2   weeks with any results.  - Patient has a contact number available for emergencies.  The signs and   symptoms of potential delayed complications were discussed with the   patient.  Return to normal activities tomorrow.  Written discharge   instructions were provided to the patient.   - Discharge patient to home.   - Resume previous diet.   - Continue present medications.   - Await pathology results.   - Repeat colonoscopy in 1 year for surveillance.   For questions, problems or results please call your physician - Carlitos Jimenez MD at Work:  [phone].  [facility], EMERGENCY ROOM PHONE NUMBER: [phone]  IF A COMPLICATION OR EMERGENCY SITUATION ARISES AND YOU ARE UNABLE TO REACH   YOUR PHYSICIAN - GO DIRECTLY TO THE EMERGENCY ROOM.  Carlitos Jimenez MD  9/18/2023 11:54:20 AM  This report has been verified and signed electronically.  Dear patient,  As a result of recent federal legislation (The Federal Cures Act), you may   receive lab or pathology results from your procedure in your [facility]   account before your physician is able to contact you. Your physician or   their representative will relay the results to you with their   recommendations at their soonest availability.  Thank you,  PROVATION

## 2023-09-18 NOTE — H&P
Short Stay Endoscopy History and Physical    PCP - Agueda Steve MD    Procedure - Colonoscopy  Sedation: GA  ASA - per anesthesia  Mallampati - per anesthesia  History of Anesthesia problems - no  Family history Anesthesia problems -  no     HPI:  This is a 47 y.o. female here for evaluation of : Screening for CRC    Reflux - no  Dysphagia - no  Abdominal pain - no  Diarrhea - no    ROS:  Constitutional: No fevers, chills, No weight loss  ENT: No allergies  CV: No chest pain  Pulm: No cough, No shortness of breath  Ophtho: No vision changes  GI: see HPI  Medical History:  has a past medical history of Anxiety, Depression, and PTSD (post-traumatic stress disorder).    Surgical History:  has no past surgical history on file.    Family History: family history includes Breast cancer in her mother; Liver disease in her mother; Prostate cancer in her father; Thyroid disease in her mother.. Otherwise no colon cancer, inflammatory bowel disease, or GI malignancies.    Social History:  reports that she has never smoked. She has never used smokeless tobacco. She reports current alcohol use. She reports that she does not use drugs.    Review of patient's allergies indicates:  No Known Allergies    Medications:   Medications Prior to Admission   Medication Sig Dispense Refill Last Dose    albuterol (PROVENTIL/VENTOLIN HFA) 90 mcg/actuation inhaler Inhale 2 puffs into the lungs every 6 (six) hours as needed for Wheezing. 1 each 11 Past Week    alprazolam (XANAX) 1 MG tablet Take by mouth 2 (two) times daily.   More than a month    budesonide-formoterol 160-4.5 mcg (SYMBICORT) 160-4.5 mcg/actuation HFAA Inhale 2 puffs into the lungs 2 (two) times daily. Rinse mouth out after use 1 Inhaler 12     PRAZOSIN HCL (PRAZOSIN ORAL) Take by mouth.       sertraline (ZOLOFT) 100 MG tablet Take 100 mg by mouth once daily.   More than a month    tirzepatide 7.5 mg/0.5 mL PnIj Inject 7.5 mg into the skin every 7 days.   9/7/2023     trazodone (DESYREL) 100 MG tablet Take 100 mg by mouth every evening.   More than a month       Objective Findings:    Vital Signs: Per nursing notes.    Physical Exam:  General Appearance: Well appearing in no acute distress  Head:   Normocephalic, without obvious abnormality  Eyes:    No scleral icterus  Airway: Open  Neck: No restriction in mobility  Lungs: CTA bilaterally in anterior and posterior fields, no wheezes, no crackles.  Heart:  Regular rate and rhythm, S1, S2 normal, no murmurs heard  Abdomen: Soft, non tender, non distended      Labs:  Lab Results   Component Value Date    WBC 5.68 07/28/2023    HGB 12.6 07/28/2023    HCT 37.3 07/28/2023     07/28/2023    CHOL 226 (H) 07/28/2023    TRIG 83 07/28/2023    HDL 50 07/28/2023    ALT 29 07/28/2023    AST 21 07/28/2023     07/28/2023    K 3.4 (L) 07/28/2023     07/28/2023    CREATININE 0.9 07/28/2023    BUN 6 07/28/2023    CO2 22 (L) 07/28/2023    TSH 4.310 (H) 07/28/2023    HGBA1C 5.2 07/28/2023         I have explained the risks and benefits of endoscopy procedures to the patient including but not limited to bleeding, perforation, infection, and death.    Thank you so much for allowing me to participate in the care of Adriana Jimenez MD

## 2023-09-18 NOTE — PLAN OF CARE
Discharge instructions given and explained to patient with verbalization of understanding all instructions. Patients v/s stable, denies n/v and tolerating po fluids, rates pain level 0/10, IV removed, and family (father Roberto) reached and available for patient discharge home.

## 2023-09-18 NOTE — PLAN OF CARE
Plan of care reviewed. Pt verbalizes understanding. Questions and concerns addressed. Call button within reach.

## 2023-09-18 NOTE — ANESTHESIA POSTPROCEDURE EVALUATION
Anesthesia Post Evaluation    Patient: Adriana Cardozo    Procedure(s) Performed: Procedure(s) (LRB):  COLONOSCOPY (N/A)    Final Anesthesia Type: general      Patient location during evaluation: GI PACU  Patient participation: Yes- Able to Participate  Level of consciousness: awake and alert  Post-procedure vital signs: reviewed and stable  Pain management: adequate  Airway patency: patent    PONV status at discharge: No PONV  Anesthetic complications: no      Cardiovascular status: blood pressure returned to baseline  Respiratory status: unassisted, spontaneous ventilation and room air  Hydration status: euvolemic  Follow-up not needed.          Vitals Value Taken Time   /67 09/18/23 1202     09/18/23 1205   Pulse 78 09/18/23 1204   Resp 12 09/18/23 1204   SpO2 100 % 09/18/23 1200   Vitals shown include unvalidated device data.      No case tracking events are documented in the log.      Pain/Aniket Score: Aniket Score: 10 (9/18/2023 11:53 AM)

## 2023-09-20 LAB
FINAL PATHOLOGIC DIAGNOSIS: NORMAL
GROSS: NORMAL
Lab: NORMAL

## 2023-09-22 ENCOUNTER — TELEPHONE (OUTPATIENT)
Dept: GASTROENTEROLOGY | Facility: CLINIC | Age: 48
End: 2023-09-22
Payer: COMMERCIAL

## 2023-09-22 NOTE — TELEPHONE ENCOUNTER
"Called Patient regarding results to inform P/   "Please call and notify patient, the colon polyps were benign"     Pt confirmed and verbalized understanding.   "

## 2023-09-22 NOTE — TELEPHONE ENCOUNTER
----- Message from Carlitos Jimenez MD sent at 9/22/2023  2:27 PM CDT -----  Please call and notify patient, the colon polyps were benign.

## 2023-12-21 NOTE — PROGRESS NOTES
"  Physical Therapy Daily Treatment Note     Name: Adriana HERNANDEZ [facility] Number: [id number]    Therapy Diagnosis:   Encounter Diagnoses   Name Primary?    Left hip pain     Decreased strength, endurance, and mobility      Physician: Bar Chaidez III, *    Visit Date: 4/12/2019    Physician Orders: PT Eval and Treat - treat bilaterally with focus on left   Medical Diagnosis:   M25.552 (ICD-10-CM) - Left hip pain   G57.02 (ICD-10-CM) - Piriformis syndrome of left side     Evaluation Date: 3/13/2019  Authorization Period Expiration: 12/31/2019  Plan of Care Certification Period: 6/13/2019  Visit #/Visits authorized: 7/20    Time In: 11:15 AM  Time Out: 12:00 PM  Total Billable Time: 45 minutes    Precautions: Standard    Subjective     Pt reports: had a setback and feeling hurt today. "I purchased the DDP yoga program and I think I overdid it." Pt stated that her lower back hurts today. Pt described the pain as soreness all over.  She was compliant with home exercise program.  Response to previous treatment: fine, relief from dry needling  Functional change: improved tolerance with walking    Pain: 5/10  Location: left buttocks, L lateral knee, gastroc    Objective     Adriana received therapeutic exercises to develop strength, endurance, ROM, flexibility, posture and core stabilization for 35 minutes including:    +PPT 15 x 5"  +TrA Activation 10" x 10  +LTR 20 x 5"  Glute sets 20 x 5"  Bridges 3 x 10  Hip adduction ball squeeze 20 x 5"  SLR 3 x 10  SL clamshells 20 x 5" w/ OTB   SL hip abd 3 x 10  Figure 4 IR/ER stretch 3 x 30"  Piriformis stretch 3 x 30"  Hamstring stretch 2 x 1' (manually by PTA) - changed to using strap today  Gastroc SB stretch 2 x 1' -- change to incline board today  +heel raise 2 x 10    Adriana received the following manual therapy techniques:   00 min x dry needling - see note by Kathi Lema, PT   5 min x Myofacial release, Soft tissue Mobilization and Friction Massage were " Pt temporarily removed from low intermittent suction for G tube and will be reattached approximately at 0645   applied to the: L hip, including: Rolling stick to ITB, gluteals, gastroc region    Adriana received hot pack for 10 minutes to glutes, gastroc --- performed at end of therapy session today    Home Exercises Provided and Patient Education Provided     Education provided:   - rationale behind each ex, proper form and technique    Written Home Exercises Provided: Patient instructed to cont prior HEP.  Exercises were reviewed and Adriana was able to demonstrate them prior to the end of the session.  Adriana demonstrated good  understanding of the education provided.     See EMR under Media for exercises provided prior visit.    Assessment     Added a few core exercises for stability and pt reports of relief. Good tolerance without increased pain today.    Adriana is progressing well towards her goals.   Pt prognosis is Guarded.     Pt will continue to benefit from skilled outpatient physical therapy to address the deficits listed in the problem list box on initial evaluation, provide pt/family education and to maximize pt's level of independence in the home and community environment.     Pt's spiritual, cultural and educational needs considered and pt agreeable to plan of care and goals.     Anticipated barriers to physical therapy: financial consideration     Goals:     Short Term Goals (4 Weeks):  1. 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 (progressing, not met)  2.  Pt able to transfer in/out of chairs of various heights with <5/10 pain . (progressing, not met)   3. Pt able to sleep >4 hours without pain disturbance. (progressing, not met)   4. Pt to demonstrate improved functional ability with FOTO limitation <=35% disability. (progressing, not met)     Long Term Goals (8 Weeks):  1. Pt able to sit >= 1 hour with CPU work with <3/10 pain. (progressing, not met)   2. Pt is independent with all bed mobility. (progressing, not met)   3. Pt able to sleep a full night without pain disturbance.  (progressing, not met)   4. Pt able to return to full work / recreational activities with min difficulty and pain <3/10. (progressing, not met)   5. Pt will be independent with HEP and self management of symptoms.  (progressing, not met)   6. Pt to demonstrate improved functional ability with FOTO limitation <=25% disability.  (progressing, not met)     Plan     Continue with established PT Plan of Care and focus on hip ROM, strengthening, and manual therapy.    Salinas Gonsales, PTA

## 2024-03-04 ENCOUNTER — OFFICE VISIT (OUTPATIENT)
Dept: INTERNAL MEDICINE | Facility: CLINIC | Age: 49
End: 2024-03-04
Payer: COMMERCIAL

## 2024-03-04 VITALS
DIASTOLIC BLOOD PRESSURE: 74 MMHG | HEIGHT: 67 IN | BODY MASS INDEX: 35.4 KG/M2 | HEART RATE: 80 BPM | OXYGEN SATURATION: 99 % | WEIGHT: 225.56 LBS | SYSTOLIC BLOOD PRESSURE: 120 MMHG

## 2024-03-04 DIAGNOSIS — E55.9 VITAMIN D DEFICIENCY: ICD-10-CM

## 2024-03-04 DIAGNOSIS — E66.9 CLASS 2 OBESITY WITH BODY MASS INDEX (BMI) OF 35.0 TO 35.9 IN ADULT, UNSPECIFIED OBESITY TYPE, UNSPECIFIED WHETHER SERIOUS COMORBIDITY PRESENT: Primary | ICD-10-CM

## 2024-03-04 PROCEDURE — 99214 OFFICE O/P EST MOD 30 MIN: CPT | Mod: S$GLB,,, | Performed by: PHYSICIAN ASSISTANT

## 2024-03-04 PROCEDURE — 1159F MED LIST DOCD IN RCRD: CPT | Mod: CPTII,S$GLB,, | Performed by: PHYSICIAN ASSISTANT

## 2024-03-04 PROCEDURE — 1160F RVW MEDS BY RX/DR IN RCRD: CPT | Mod: CPTII,S$GLB,, | Performed by: PHYSICIAN ASSISTANT

## 2024-03-04 PROCEDURE — 3074F SYST BP LT 130 MM HG: CPT | Mod: CPTII,S$GLB,, | Performed by: PHYSICIAN ASSISTANT

## 2024-03-04 PROCEDURE — 3078F DIAST BP <80 MM HG: CPT | Mod: CPTII,S$GLB,, | Performed by: PHYSICIAN ASSISTANT

## 2024-03-04 PROCEDURE — 99999 PR PBB SHADOW E&M-EST. PATIENT-LVL IV: CPT | Mod: PBBFAC,,, | Performed by: PHYSICIAN ASSISTANT

## 2024-03-04 PROCEDURE — 3008F BODY MASS INDEX DOCD: CPT | Mod: CPTII,S$GLB,, | Performed by: PHYSICIAN ASSISTANT

## 2024-03-04 RX ORDER — ERGOCALCIFEROL 1.25 MG/1
50000 CAPSULE ORAL
Qty: 12 CAPSULE | Refills: 0 | Status: SHIPPED | OUTPATIENT
Start: 2024-03-04 | End: 2024-05-21

## 2024-03-04 RX ORDER — TIRZEPATIDE 10 MG/.5ML
10 INJECTION, SOLUTION SUBCUTANEOUS
Qty: 4 PEN | Refills: 0 | Status: SHIPPED | OUTPATIENT
Start: 2024-04-01

## 2024-03-04 RX ORDER — TIRZEPATIDE 7.5 MG/.5ML
7.5 INJECTION, SOLUTION SUBCUTANEOUS
Qty: 4 PEN | Refills: 0 | Status: SHIPPED | OUTPATIENT
Start: 2024-03-04 | End: 2024-04-01

## 2024-03-04 NOTE — PROGRESS NOTES
"Subjective     Patient ID: Adriana Cardozo is a 48 y.o. female.    Chief Complaint: Weight Loss    HPI      Here to discuss Zepbound  Struggled with weight, has noticed inches lost, smaller clothing sizes but lbs plateau.   on/off compound tirzepatide since May 2023, 2.5mg to 7.5mg., sometime stretching the injection 2/2 cost. She is tolerating well with any GI s/s. Reviewed diet (wine nightly, snacking, cheez-it (can finish box in 1 day)        Wt Readings from Last 3 Encounters:   03/04/24 1127 102.3 kg (225 lb 9.2 oz)   09/18/23 1043 104.3 kg (230 lb)   07/27/23 1110 107.8 kg (237 lb 10.5 oz)         Past Medical History:   Diagnosis Date    Anxiety     Depression     PTSD (post-traumatic stress disorder)      Social History     Tobacco Use    Smoking status: Never    Smokeless tobacco: Never   Substance Use Topics    Alcohol use: Yes     Comment: soc    Drug use: No     Review of patient's allergies indicates:  No Known Allergies      Review of Systems   Constitutional:  Negative for chills, diaphoresis and fever.   Respiratory:  Negative for cough and shortness of breath.    Cardiovascular:  Negative for chest pain.   Gastrointestinal:  Negative for abdominal pain, nausea and vomiting.   Psychiatric/Behavioral:  Positive for sleep disturbance.           Objective  /74 (BP Location: Right arm, Patient Position: Sitting, BP Method: Large (Manual))   Pulse 80   Ht 5' 7" (1.702 m)   Wt 102.3 kg (225 lb 9.2 oz)   SpO2 99%   BMI 35.33 kg/m²       Physical Exam  Constitutional:       General: She is not in acute distress.     Appearance: She is not ill-appearing.   Pulmonary:      Effort: Pulmonary effort is normal. No respiratory distress.   Neurological:      Mental Status: She is alert.   Psychiatric:         Mood and Affect: Mood normal.            Assessment and Plan     1. Class 2 obesity with body mass index (BMI) of 35.0 to 35.9 in adult, unspecified obesity type, unspecified whether serious " comorbidity present  Reviewed diet modification, food journaling  Rx print for Zepbound. Pt plans to used savings card and pay out of pocket  -     tirzepatide, weight loss, (ZEPBOUND) 7.5 mg/0.5 mL PnIj; Inject 7.5 mg into the skin every 7 days.  Dispense: 4 Pen; Refill: 0  -     tirzepatide, weight loss, (ZEPBOUND) 10 mg/0.5 mL PnIj; Inject 10 mg into the skin every 7 days.  Dispense: 4 Pen; Refill: 0    2. Vitamin D deficiency  -     ergocalciferol (ERGOCALCIFEROL) 50,000 unit Cap; Take 1 capsule (50,000 Units total) by mouth every 7 days. for 12 doses  Dispense: 12 capsule; Refill: 0    RTC in 1 month for weight check/food journal review/med eval      This is a 30 minute visit, over 50% of time spent devoted minutes of total time spent on the encounter, which includes face to face time and non-face to face time preparing to see the patient (eg, review of tests), Obtaining and/or reviewing separately obtained history, Documenting clinical information in the electronic or other health record, Independently interpreting results (not separately reported) and communicating results to the patient/family/caregiver, or Care coordination (not separately reported).    Jenny Vivar PA-C

## 2025-07-02 ENCOUNTER — TELEPHONE (OUTPATIENT)
Dept: INTERNAL MEDICINE | Facility: CLINIC | Age: 50
End: 2025-07-02
Payer: COMMERCIAL

## 2025-07-02 NOTE — TELEPHONE ENCOUNTER
Copied from CRM #[id number]. Topic: General Inquiry - Patient Advice  >> Jul 2, 2025  3:49 PM Ruthie wrote:  .1MEDICALADVICE     Patient is calling for Medical Advice regarding:Colonoscopy     How long has patient had these symptoms:DANIEL    Pharmacy name and phone#:NA    Patient wants a call back or thru [facility], provide patient's call back phone number: call back at:[phone]    Comments: pt said that she is calling in regards to needing to know if she needs to be seen for an Ov  to have a Colonoscopy or can she just get scheduled for the procedure . Please advise     Please advise patient replies from provider may take up to 48 hours.

## 2025-07-02 NOTE — TELEPHONE ENCOUNTER
Pt was inquiring about getting a colonoscopy order in. Informed pt that she will have to see LETITIA Vivar before an order can be placed. Pt said she will scheduled the appt on at her convenience

## 2025-07-07 ENCOUNTER — OFFICE VISIT (OUTPATIENT)
Dept: INTERNAL MEDICINE | Facility: CLINIC | Age: 50
End: 2025-07-07
Payer: COMMERCIAL

## 2025-07-07 VITALS
DIASTOLIC BLOOD PRESSURE: 80 MMHG | HEIGHT: 67 IN | BODY MASS INDEX: 33.15 KG/M2 | SYSTOLIC BLOOD PRESSURE: 130 MMHG | WEIGHT: 211.19 LBS

## 2025-07-07 DIAGNOSIS — E66.09 CLASS 1 OBESITY DUE TO EXCESS CALORIES WITHOUT SERIOUS COMORBIDITY WITH BODY MASS INDEX (BMI) OF 33.0 TO 33.9 IN ADULT: ICD-10-CM

## 2025-07-07 DIAGNOSIS — E66.811 CLASS 1 OBESITY DUE TO EXCESS CALORIES WITHOUT SERIOUS COMORBIDITY WITH BODY MASS INDEX (BMI) OF 33.0 TO 33.9 IN ADULT: ICD-10-CM

## 2025-07-07 DIAGNOSIS — E55.9 VITAMIN D DEFICIENCY: ICD-10-CM

## 2025-07-07 DIAGNOSIS — F41.9 ANXIETY: ICD-10-CM

## 2025-07-07 DIAGNOSIS — Z00.00 ROUTINE PHYSICAL EXAMINATION: Primary | ICD-10-CM

## 2025-07-07 DIAGNOSIS — Z12.11 COLON CANCER SCREENING: ICD-10-CM

## 2025-07-07 PROCEDURE — 99396 PREV VISIT EST AGE 40-64: CPT | Mod: S$GLB,,, | Performed by: PHYSICIAN ASSISTANT

## 2025-07-07 PROCEDURE — 3079F DIAST BP 80-89 MM HG: CPT | Mod: CPTII,S$GLB,, | Performed by: PHYSICIAN ASSISTANT

## 2025-07-07 PROCEDURE — 99999 PR PBB SHADOW E&M-EST. PATIENT-LVL IV: CPT | Mod: PBBFAC,,, | Performed by: PHYSICIAN ASSISTANT

## 2025-07-07 PROCEDURE — 1159F MED LIST DOCD IN RCRD: CPT | Mod: CPTII,S$GLB,, | Performed by: PHYSICIAN ASSISTANT

## 2025-07-07 PROCEDURE — 3008F BODY MASS INDEX DOCD: CPT | Mod: CPTII,S$GLB,, | Performed by: PHYSICIAN ASSISTANT

## 2025-07-07 PROCEDURE — 3075F SYST BP GE 130 - 139MM HG: CPT | Mod: CPTII,S$GLB,, | Performed by: PHYSICIAN ASSISTANT

## 2025-07-07 PROCEDURE — 1160F RVW MEDS BY RX/DR IN RCRD: CPT | Mod: CPTII,S$GLB,, | Performed by: PHYSICIAN ASSISTANT

## 2025-07-07 NOTE — PROGRESS NOTES
"Subjective     Patient ID: Adriana Cardozo is a 49 y.o. female.    Chief Complaint: Follow-up    HPI    Established pt of Agueda Steve MD     Here for annual exam    Overall doing well  Has lost weight on compound tirzepatide 10mg, taking approx every 2 weeks, followed by outside provider. Occ constipation (taking fiber laxative prn)  Still has anxiety(work related, working on avoiding triggers and setting boundaries), open to seeing a therapist, but not interested in pharmacotherapy    Due for repeat colonoscopy.     MMG UTD with outside provider (alternates q6 mo with breast ultrasound    Past Medical History:   Diagnosis Date    Anxiety     Depression     PTSD (post-traumatic stress disorder)      Social History[1]    Review of patient's allergies indicates:  No Known Allergies    Review of Systems   Constitutional:  Negative for chills, fever and unexpected weight change.   Respiratory:  Negative for cough and shortness of breath.    Cardiovascular:  Negative for chest pain and leg swelling.   Gastrointestinal:  Negative for abdominal pain, nausea and vomiting.   Integumentary:  Negative for rash.   Neurological:  Negative for weakness and headaches.          Objective  /80 (BP Location: Right arm, Patient Position: Sitting)   Ht 5' 7" (1.702 m)   Wt 95.8 kg (211 lb 3.2 oz)   BMI 33.08 kg/m²       Physical Exam  Vitals reviewed.   Constitutional:       General: She is not in acute distress.     Appearance: She is well-developed. She is not ill-appearing.   HENT:      Head: Normocephalic and atraumatic.      Right Ear: Tympanic membrane, ear canal and external ear normal.      Left Ear: Tympanic membrane, ear canal and external ear normal.   Cardiovascular:      Rate and Rhythm: Normal rate and regular rhythm.      Heart sounds: No murmur heard.  Pulmonary:      Effort: Pulmonary effort is normal.      Breath sounds: Normal breath sounds. No wheezing or rales.   Abdominal:      General: Bowel " sounds are normal.      Palpations: Abdomen is soft.      Tenderness: There is no abdominal tenderness.   Musculoskeletal:      Right lower leg: No edema.      Left lower leg: No edema.   Skin:     General: Skin is warm and dry.      Findings: No rash.   Neurological:      Mental Status: She is alert.   Psychiatric:         Mood and Affect: Mood normal.            Assessment and Plan     1. Routine physical examination  HM reviewed and updated  -     CBC Auto Differential; Future; Expected date: 07/07/2025  -     Comprehensive Metabolic Panel; Future; Expected date: 07/07/2025  -     TSH; Future; Expected date: 07/07/2025  -     Lipid Panel; Future; Expected date: 07/07/2025  -     Hemoglobin A1C; Future; Expected date: 07/07/2025    2. Vitamin D deficiency  Completed weekly 50,000IU dose for 12 weeks  Advised on OTC supplementation  -     Vitamin D; Future; Expected date: 01/03/2026    3. Anxiety  Stable, stress relieving strategies discussed  -     Ambulatory referral/consult to Psychiatry; Future; Expected date: 07/14/2025    4. Colon cancer screening  -     Ambulatory referral/consult to Endo Procedure ; Future; Expected date: 07/08/2025    RTC prn    Future Appointments   Date Time Provider Department Center   7/8/2025 11:30 AM LAB[facility]   7/8/2025  3:45 PM PRIV PRE-ADMIT, ENDO -[facility] ENDO4 [facility]         Jenny Vivar PA-C       [1]   Social History  Tobacco Use    Smoking status: Never    Smokeless tobacco: Never   Substance Use Topics    Alcohol use: Yes     Comment: soc    Drug use: No

## 2025-07-08 ENCOUNTER — CLINICAL SUPPORT (OUTPATIENT)
Dept: ENDOSCOPY | Facility: HOSPITAL | Age: 50
End: 2025-07-08
Payer: COMMERCIAL

## 2025-07-08 ENCOUNTER — TELEPHONE (OUTPATIENT)
Dept: ENDOSCOPY | Facility: HOSPITAL | Age: 50
End: 2025-07-08

## 2025-07-08 DIAGNOSIS — Z12.11 COLON CANCER SCREENING: ICD-10-CM

## 2025-07-08 DIAGNOSIS — Z12.11 SPECIAL SCREENING FOR MALIGNANT NEOPLASMS, COLON: Primary | ICD-10-CM

## 2025-07-08 RX ORDER — SODIUM, POTASSIUM,MAG SULFATES 17.5-3.13G
4 SOLUTION, RECONSTITUTED, ORAL ORAL DAILY
Qty: 1 KIT | Refills: 0 | Status: SHIPPED | OUTPATIENT
Start: 2025-08-18 | End: 2025-08-20

## 2025-07-08 NOTE — PLAN OF CARE
Patient is scheduled for a Colonoscopy on 08/25/2025 with Dr. DAVION Thomas  Referral for procedure from PAT appointment

## 2025-07-08 NOTE — PATIENT INSTRUCTIONS
Colonoscopy Procedure Prep Instructions        Date of procedure: 08/25/2025 Arrive at: 12:55PM    Location of Department:   [facility] [address] [place] [address]  Take the [facility] Elevators to 4th Floor Endoscopy Lab      As soon as possible:   your prep from pharmacy and over the counter DULCOLAX LAXATIVE TABLETS     What You Can do:  You may have clear liquids ONLY-see below for list.     Liquids That Are OK to Drink:   Water  Sports drinks (Gatorade, Power-Aid)  Coffee or tea (no cream or nondairy creamer)  Clear juices without pulp (apple, white grape)  Gelatin desserts (no fruit or toppings)  Clear soda (sprite, coke, ginger ale)  Chicken broth (until 12 midnight the night before procedure)    What You CANNOT do:   Do not EAT solid food, drink milk or anything   colored red.  Do not drink alcohol.  Do not take oral medications within 1 hour of starting each dose of SUPREP.  No gum chewing or candy morning of procedure.    Note:   You will be picking up (2- KITS) from the pharmacy.   Please disregard the insert instructions from pharmacy).  SUPREP Bowel Prep Kit is indicated for cleansing of the colon as a preparation for colonoscopy in adults.   Be sure to tell your doctor about all the medicines you take, including prescription and non-prescription medicines, vitamins, and herbal supplements. SUPREP Bowel Prep Kit may affect how other medicines work.  Medication taken by mouth may not be absorbed properly when taken within 1 hour before the start of each dose of SUPREP Bowel Prep Kit.  It is not uncommon to experience some abdominal cramping, nausea and/or vomiting when taking the prep. If you have nausea and/or vomiting while taking the prep, stop drinking for 20 to 30 minutes then continue.    Two (2)-SUPREP Bowel Prep Kit is a (4-dose) prep.   ALL 6-ounce bottles are required for a complete preparation for colonoscopy. Dilute the solution concentrate as directed prior  to use. You must drink water with each dose of SUPREP, and additional water after each dose.    How to take prep:    DOSE 1-- 2 Days Before Colonoscopy 08/23/2025     Drink at least 6 to 8 glasses of clear liquids from time you wake up until you begin your prep and then continue until bedtime to avoid dehydration.     12:00 pm (NOON) Take four (4) Dulcolax (Bisacodyl) tablets with at least 8 oz. or more of clear liquids.    At 6:00 pm:  You must complete Steps 1 through 4 using one (1)   6-ounce bottle before going to bed as shown below:    Step 1-Pour ONE (1) 6-ounce bottle of SUPREP liquid into the mixing container  Step 2-Add cool drinking water to the 16-ounce line on the container and mix  Step 3-Drink ALL the liquid in the container.  Step 4-You must drink two (2) more 16-ounce containers of water over the next 1 hour    DOSE 2--Day Before Colonoscopy 08/24/2025     Drink at least 6 to 8 glasses of clear liquids from time you wake up until you begin your prep and then continue until bedtime to avoid dehydration.      8:00 am:  For this dose, repeat Steps 1 through 4 shown above using one (1) 6-ounce bottle.        6:00 pm:  For this dose, repeat Steps 1 through 4 shown above using one (1) 6-ounce bottle.     IMPORTANT: If you experience preparation-related symptoms (for example, nausea, bloating, or cramping), stop, or slow the rate of drinking the additional water until your symptoms decrease.    DOSE 3--Day of the Colonoscopy 08/25/2025  at 2-3 AM.    For this dose, repeat Steps 1 through 4 shown above using one (1) 6-ounce bottle.     You may continue drinking water/clear liquids until   4 hours before your colonoscopy or as directed by the scheduling nurse  09:55AM.      For information about your procedure, two (2) things to view prior to colonoscopy:  Please watch this informational video. It is important to watch this animated consent video prior to your arrival. If you haven't watched the video prior  to arriving, you are required to watch it during admission which can causes delays.    Options for viewing:   Using a keyboard:  press and hold the control tab (Ctrl) and left mouse click to follow links.           Colonoscopy Instructional Video                                                                                   OR    Type link address into your web browser's address bar:  [email or web address]      Educational Booklet with pictures:      Colonoscopy Prep - Liquid      Comments:        IMPORTANT INFORMATION TO KNOW BEFORE YOUR PROCEDURE    [facility] 4th Floor     If your procedure requires the administration of anesthesia, it is necessary for a responsible adult to drive you home. The designated adult is strongly encouraged to remain in the endoscopy area until the patient is discharged. (Medical Transportation, Uber, Lyft, Taxi, etc. may ONLY be used if a responsible adult is present to accompany you home. The responsible adult CANNOT be the  of the service.)     person must be available to return to pick you up within 15 minutes of being notified of discharge.     Please bring a picture ID, insurance card, and copayment.     Take Medications as directed below:    If you are taking any injectable medication (s) for weight loss and/or diabetes  weekly, hold for 8 days prior to your scheduled procedure, please stop taking Zepbound (Tirzepatide)}on 8/17/2025. After the procedure, your provider will inform you  of when to resume injection.      If you begin taking any blood thinning medications, injectable weight loss/diabetes medications (other than insulin) , Adipex (Phentermine) , please contact the endoscopy scheduling department listed below as soon as possible.    If you are diabetic see the attached instruction sheet regarding your medication.     If you take HEART, BLOOD PRESSURE, SEIZURE, PAIN, LUNG (including inhalers/nebulizers),  ANTI-REJECTION (transplant patients), or PSYCHIATRIC medications, please take at your regular times with a sip of water or as directed by the scheduling nurse.     Important contact information:    Endoscopy [phone] Hours of operation Monday-Friday 8:00-4:30pm.    Questions about insurance or financial obligations call [phone] or [phone].    If you have questions regarding the prep or need to reschedule, please call [phone]. After hours questions requiring immediate assistance, contact [facility] nurse line at [phone] or [phone].   NOTE:     On occasion, unforeseen circumstances may cause a delay in your procedure start time. We respect your time and appreciate your patience during these circumstances.      Comments:

## 2025-07-16 ENCOUNTER — TELEPHONE (OUTPATIENT)
Dept: ENDOSCOPY | Facility: HOSPITAL | Age: 50
End: 2025-07-16
Payer: COMMERCIAL

## 2025-07-16 NOTE — TELEPHONE ENCOUNTER
Copied from CRM #[id number]. Topic: Appointments - Appointment Access  >> Jun 18, 2025 12:53 PM Debora wrote:  Type: Procedure Schedule Request      Name of Caller:Ms Waiters  When is the first available appointment?n/a  Would the patient rather a call back or a response via [facility]? Call   Best Call Back Number:[phone]  Additional Information: please call to schedule  >> Jun 18, 2025  2:40 PM HAMIDA Salgado wrote:  This does not belong to Endocrine Surgery.

## 2025-07-23 ENCOUNTER — TELEPHONE (OUTPATIENT)
Dept: GASTROENTEROLOGY | Facility: CLINIC | Age: 50
End: 2025-07-23
Payer: COMMERCIAL

## 2025-07-23 NOTE — TELEPHONE ENCOUNTER
Received below message.  Telephoned pt with no answer.  Left voicemail message with direct contact number for pt to return call.  Portal message also sent.

## 2025-07-23 NOTE — TELEPHONE ENCOUNTER
Copied from CRM #[id number]. Topic: Appointments - Appointment Access  >> Jul 22, 2025 12:10 PM Tyesha wrote:  Patient calling to advise she mistakenly took her weight loss injection 12.5 Monjoro on Sunday 7/20.     Would like to know if she can still keep her colonoscopy appt.  Pls call [phone]

## 2025-08-19 ENCOUNTER — TELEPHONE (OUTPATIENT)
Dept: ENDOSCOPY | Facility: HOSPITAL | Age: 50
End: 2025-08-19
Payer: COMMERCIAL

## 2025-08-19 DIAGNOSIS — Z12.11 COLON CANCER SCREENING: Primary | ICD-10-CM
